# Patient Record
Sex: MALE | Race: WHITE | NOT HISPANIC OR LATINO | Employment: OTHER | URBAN - METROPOLITAN AREA
[De-identification: names, ages, dates, MRNs, and addresses within clinical notes are randomized per-mention and may not be internally consistent; named-entity substitution may affect disease eponyms.]

---

## 2019-12-03 ENCOUNTER — OFFICE VISIT (OUTPATIENT)
Dept: CARDIOLOGY | Facility: MEDICAL CENTER | Age: 79
End: 2019-12-03
Payer: MEDICARE

## 2019-12-03 ENCOUNTER — HOSPITAL ENCOUNTER (OUTPATIENT)
Dept: RADIOLOGY | Facility: MEDICAL CENTER | Age: 79
End: 2019-12-03
Attending: INTERNAL MEDICINE
Payer: MEDICARE

## 2019-12-03 ENCOUNTER — HOSPITAL ENCOUNTER (OUTPATIENT)
Facility: MEDICAL CENTER | Age: 79
End: 2019-12-04
Attending: EMERGENCY MEDICINE | Admitting: HOSPITALIST
Payer: MEDICARE

## 2019-12-03 ENCOUNTER — APPOINTMENT (OUTPATIENT)
Dept: RADIOLOGY | Facility: MEDICAL CENTER | Age: 79
End: 2019-12-03
Payer: MEDICARE

## 2019-12-03 VITALS
BODY MASS INDEX: 27.94 KG/M2 | HEART RATE: 80 BPM | HEIGHT: 67 IN | OXYGEN SATURATION: 95 % | WEIGHT: 178 LBS | DIASTOLIC BLOOD PRESSURE: 70 MMHG | SYSTOLIC BLOOD PRESSURE: 126 MMHG

## 2019-12-03 DIAGNOSIS — I48.0 PAF (PAROXYSMAL ATRIAL FIBRILLATION) (HCC): ICD-10-CM

## 2019-12-03 DIAGNOSIS — R06.02 SHORTNESS OF BREATH: ICD-10-CM

## 2019-12-03 DIAGNOSIS — R05.9 COUGH: ICD-10-CM

## 2019-12-03 DIAGNOSIS — E78.5 DYSLIPIDEMIA: ICD-10-CM

## 2019-12-03 DIAGNOSIS — J69.0 ASPIRATION PNEUMONIA OF RIGHT LOWER LOBE, UNSPECIFIED ASPIRATION PNEUMONIA TYPE (HCC): ICD-10-CM

## 2019-12-03 DIAGNOSIS — I10 ESSENTIAL HYPERTENSION, BENIGN: ICD-10-CM

## 2019-12-03 DIAGNOSIS — Z86.73 HISTORY OF CVA (CEREBROVASCULAR ACCIDENT): ICD-10-CM

## 2019-12-03 DIAGNOSIS — Z87.891 HISTORY OF TOBACCO USE: ICD-10-CM

## 2019-12-03 DIAGNOSIS — Z95.818 STATUS POST PLACEMENT OF IMPLANTABLE LOOP RECORDER: ICD-10-CM

## 2019-12-03 DIAGNOSIS — R47.01 EXPRESSIVE APHASIA: ICD-10-CM

## 2019-12-03 DIAGNOSIS — Z86.39 HISTORY OF HYPOTHYROIDISM: ICD-10-CM

## 2019-12-03 LAB
ALBUMIN SERPL BCP-MCNC: 4.2 G/DL (ref 3.2–4.9)
ALBUMIN/GLOB SERPL: 1.5 G/DL
ALP SERPL-CCNC: 96 U/L (ref 30–99)
ALT SERPL-CCNC: 33 U/L (ref 2–50)
ANION GAP SERPL CALC-SCNC: 9 MMOL/L (ref 0–11.9)
AST SERPL-CCNC: 22 U/L (ref 12–45)
BASOPHILS # BLD AUTO: 0.5 % (ref 0–1.8)
BASOPHILS # BLD: 0.04 K/UL (ref 0–0.12)
BILIRUB SERPL-MCNC: 0.4 MG/DL (ref 0.1–1.5)
BLOOD CULTURE HOLD CXBCH: NORMAL
BUN SERPL-MCNC: 17 MG/DL (ref 8–22)
CALCIUM SERPL-MCNC: 9.4 MG/DL (ref 8.5–10.5)
CHLORIDE SERPL-SCNC: 107 MMOL/L (ref 96–112)
CO2 SERPL-SCNC: 22 MMOL/L (ref 20–33)
CREAT SERPL-MCNC: 0.89 MG/DL (ref 0.5–1.4)
EKG IMPRESSION: NORMAL
EOSINOPHIL # BLD AUTO: 0.35 K/UL (ref 0–0.51)
EOSINOPHIL NFR BLD: 4.4 % (ref 0–6.9)
ERYTHROCYTE [DISTWIDTH] IN BLOOD BY AUTOMATED COUNT: 42.3 FL (ref 35.9–50)
GLOBULIN SER CALC-MCNC: 2.8 G/DL (ref 1.9–3.5)
GLUCOSE SERPL-MCNC: 87 MG/DL (ref 65–99)
HCT VFR BLD AUTO: 53.7 % (ref 42–52)
HGB BLD-MCNC: 18 G/DL (ref 14–18)
IMM GRANULOCYTES # BLD AUTO: 0.02 K/UL (ref 0–0.11)
IMM GRANULOCYTES NFR BLD AUTO: 0.3 % (ref 0–0.9)
LYMPHOCYTES # BLD AUTO: 2.12 K/UL (ref 1–4.8)
LYMPHOCYTES NFR BLD: 26.5 % (ref 22–41)
MCH RBC QN AUTO: 30.1 PG (ref 27–33)
MCHC RBC AUTO-ENTMCNC: 33.5 G/DL (ref 33.7–35.3)
MCV RBC AUTO: 89.8 FL (ref 81.4–97.8)
MONOCYTES # BLD AUTO: 0.54 K/UL (ref 0–0.85)
MONOCYTES NFR BLD AUTO: 6.8 % (ref 0–13.4)
NEUTROPHILS # BLD AUTO: 4.93 K/UL (ref 1.82–7.42)
NEUTROPHILS NFR BLD: 61.5 % (ref 44–72)
NRBC # BLD AUTO: 0 K/UL
NRBC BLD-RTO: 0 /100 WBC
PLATELET # BLD AUTO: 286 K/UL (ref 164–446)
PMV BLD AUTO: 8.6 FL (ref 9–12.9)
POTASSIUM SERPL-SCNC: 4.3 MMOL/L (ref 3.6–5.5)
PROCALCITONIN SERPL-MCNC: <0.05 NG/ML
PROT SERPL-MCNC: 7 G/DL (ref 6–8.2)
RBC # BLD AUTO: 5.98 M/UL (ref 4.7–6.1)
SODIUM SERPL-SCNC: 138 MMOL/L (ref 135–145)
WBC # BLD AUTO: 8 K/UL (ref 4.8–10.8)

## 2019-12-03 PROCEDURE — 71046 X-RAY EXAM CHEST 2 VIEWS: CPT

## 2019-12-03 PROCEDURE — 99220 PR INITIAL OBSERVATION CARE,LEVL III: CPT | Performed by: HOSPITALIST

## 2019-12-03 PROCEDURE — 99285 EMERGENCY DEPT VISIT HI MDM: CPT

## 2019-12-03 PROCEDURE — 85025 COMPLETE CBC W/AUTO DIFF WBC: CPT

## 2019-12-03 PROCEDURE — 700111 HCHG RX REV CODE 636 W/ 250 OVERRIDE (IP): Performed by: EMERGENCY MEDICINE

## 2019-12-03 PROCEDURE — 93005 ELECTROCARDIOGRAM TRACING: CPT

## 2019-12-03 PROCEDURE — 94640 AIRWAY INHALATION TREATMENT: CPT

## 2019-12-03 PROCEDURE — 36415 COLL VENOUS BLD VENIPUNCTURE: CPT

## 2019-12-03 PROCEDURE — 700105 HCHG RX REV CODE 258: Performed by: EMERGENCY MEDICINE

## 2019-12-03 PROCEDURE — 93005 ELECTROCARDIOGRAM TRACING: CPT | Performed by: EMERGENCY MEDICINE

## 2019-12-03 PROCEDURE — G0378 HOSPITAL OBSERVATION PER HR: HCPCS

## 2019-12-03 PROCEDURE — 99205 OFFICE O/P NEW HI 60 MIN: CPT | Performed by: INTERNAL MEDICINE

## 2019-12-03 PROCEDURE — 80053 COMPREHEN METABOLIC PANEL: CPT

## 2019-12-03 PROCEDURE — 93000 ELECTROCARDIOGRAM COMPLETE: CPT | Performed by: INTERNAL MEDICINE

## 2019-12-03 PROCEDURE — 87040 BLOOD CULTURE FOR BACTERIA: CPT | Mod: 91

## 2019-12-03 PROCEDURE — 700101 HCHG RX REV CODE 250: Performed by: EMERGENCY MEDICINE

## 2019-12-03 PROCEDURE — 84145 PROCALCITONIN (PCT): CPT

## 2019-12-03 PROCEDURE — 96365 THER/PROPH/DIAG IV INF INIT: CPT

## 2019-12-03 RX ORDER — FLUTICASONE PROPIONATE 110 UG/1
2 AEROSOL, METERED RESPIRATORY (INHALATION) 2 TIMES DAILY
COMMUNITY

## 2019-12-03 RX ORDER — LEVOTHYROXINE SODIUM 175 UG/1
175 TABLET ORAL
Status: DISCONTINUED | OUTPATIENT
Start: 2019-12-04 | End: 2019-12-04 | Stop reason: HOSPADM

## 2019-12-03 RX ORDER — ONDANSETRON 4 MG/1
4 TABLET, ORALLY DISINTEGRATING ORAL EVERY 4 HOURS PRN
Status: DISCONTINUED | OUTPATIENT
Start: 2019-12-03 | End: 2019-12-04 | Stop reason: HOSPADM

## 2019-12-03 RX ORDER — LOSARTAN POTASSIUM 25 MG/1
25 TABLET ORAL DAILY
Qty: 90 TAB | Refills: 3 | Status: SHIPPED | OUTPATIENT
Start: 2019-12-03 | End: 2019-12-03

## 2019-12-03 RX ORDER — BISACODYL 10 MG
10 SUPPOSITORY, RECTAL RECTAL
Status: DISCONTINUED | OUTPATIENT
Start: 2019-12-03 | End: 2019-12-04 | Stop reason: HOSPADM

## 2019-12-03 RX ORDER — LISINOPRIL 5 MG/1
5 TABLET ORAL DAILY
Status: ON HOLD | COMMUNITY
End: 2019-12-04

## 2019-12-03 RX ORDER — LOSARTAN POTASSIUM 25 MG/1
25 TABLET ORAL DAILY
Status: DISCONTINUED | OUTPATIENT
Start: 2019-12-04 | End: 2019-12-03

## 2019-12-03 RX ORDER — ALBUTEROL SULFATE 90 UG/1
2 AEROSOL, METERED RESPIRATORY (INHALATION) EVERY 6 HOURS PRN
COMMUNITY

## 2019-12-03 RX ORDER — ATORVASTATIN CALCIUM 80 MG/1
80 TABLET, FILM COATED ORAL NIGHTLY
COMMUNITY

## 2019-12-03 RX ORDER — IPRATROPIUM BROMIDE AND ALBUTEROL SULFATE 2.5; .5 MG/3ML; MG/3ML
3 SOLUTION RESPIRATORY (INHALATION) ONCE
Status: COMPLETED | OUTPATIENT
Start: 2019-12-03 | End: 2019-12-03

## 2019-12-03 RX ORDER — AMOXICILLIN 250 MG
2 CAPSULE ORAL 2 TIMES DAILY
Status: DISCONTINUED | OUTPATIENT
Start: 2019-12-04 | End: 2019-12-04 | Stop reason: HOSPADM

## 2019-12-03 RX ORDER — CLOPIDOGREL BISULFATE 75 MG/1
75 TABLET ORAL DAILY
COMMUNITY

## 2019-12-03 RX ORDER — LOSARTAN POTASSIUM 50 MG/1
25 TABLET ORAL DAILY
Status: DISCONTINUED | OUTPATIENT
Start: 2019-12-04 | End: 2019-12-04 | Stop reason: HOSPADM

## 2019-12-03 RX ORDER — ATORVASTATIN CALCIUM 80 MG/1
80 TABLET, FILM COATED ORAL NIGHTLY
Status: DISCONTINUED | OUTPATIENT
Start: 2019-12-03 | End: 2019-12-04 | Stop reason: HOSPADM

## 2019-12-03 RX ORDER — ONDANSETRON 2 MG/ML
4 INJECTION INTRAMUSCULAR; INTRAVENOUS EVERY 4 HOURS PRN
Status: DISCONTINUED | OUTPATIENT
Start: 2019-12-03 | End: 2019-12-04 | Stop reason: HOSPADM

## 2019-12-03 RX ORDER — NICOTINE POLACRILEX 4 MG/1
20 GUM, CHEWING ORAL DAILY
COMMUNITY

## 2019-12-03 RX ORDER — CLOPIDOGREL BISULFATE 75 MG/1
75 TABLET ORAL DAILY
Status: DISCONTINUED | OUTPATIENT
Start: 2019-12-04 | End: 2019-12-04 | Stop reason: HOSPADM

## 2019-12-03 RX ORDER — AZITHROMYCIN 500 MG/1
500 INJECTION, POWDER, LYOPHILIZED, FOR SOLUTION INTRAVENOUS ONCE
Status: COMPLETED | OUTPATIENT
Start: 2019-12-03 | End: 2019-12-04

## 2019-12-03 RX ORDER — LEVOTHYROXINE SODIUM 175 UG/1
175 TABLET ORAL
COMMUNITY

## 2019-12-03 RX ORDER — POLYETHYLENE GLYCOL 3350 17 G/17G
1 POWDER, FOR SOLUTION ORAL
Status: DISCONTINUED | OUTPATIENT
Start: 2019-12-03 | End: 2019-12-04 | Stop reason: HOSPADM

## 2019-12-03 RX ORDER — LISINOPRIL 5 MG/1
5 TABLET ORAL DAILY
COMMUNITY
End: 2019-12-03

## 2019-12-03 RX ORDER — OMEPRAZOLE 20 MG/1
20 CAPSULE, DELAYED RELEASE ORAL DAILY
Status: DISCONTINUED | OUTPATIENT
Start: 2019-12-04 | End: 2019-12-04 | Stop reason: HOSPADM

## 2019-12-03 RX ADMIN — IPRATROPIUM BROMIDE AND ALBUTEROL SULFATE 3 ML: .5; 3 SOLUTION RESPIRATORY (INHALATION) at 21:21

## 2019-12-03 RX ADMIN — AMPICILLIN SODIUM AND SULBACTAM SODIUM 3 G: 2; 1 INJECTION, POWDER, FOR SOLUTION INTRAMUSCULAR; INTRAVENOUS at 22:47

## 2019-12-03 SDOH — HEALTH STABILITY: MENTAL HEALTH: HOW OFTEN DO YOU HAVE A DRINK CONTAINING ALCOHOL?: NOT ASKED

## 2019-12-03 ASSESSMENT — ENCOUNTER SYMPTOMS
SHORTNESS OF BREATH: 1
PALPITATIONS: 0
FOCAL WEAKNESS: 1
ABDOMINAL PAIN: 0
PND: 0
BLURRED VISION: 0
HEARTBURN: 1
MEMORY LOSS: 1
DIZZINESS: 0
SPEECH CHANGE: 1
INSOMNIA: 0
CONSTIPATION: 1
WHEEZING: 1
DIARRHEA: 1
CHILLS: 0
LOSS OF CONSCIOUSNESS: 0
FEVER: 0
MYALGIAS: 0
WEAKNESS: 1
ORTHOPNEA: 0
FALLS: 1
COUGH: 1

## 2019-12-03 ASSESSMENT — CHA2DS2 SCORE
AGE 65 TO 74: NO
CHF OR LEFT VENTRICULAR DYSFUNCTION: NO
DIABETES: NO
HYPERTENSION: YES
VASCULAR DISEASE: NO
AGE 75 OR GREATER: YES
CHA2DS2 VASC SCORE: 5
PRIOR STROKE OR TIA OR THROMBOEMBOLISM: YES
SEX: MALE

## 2019-12-03 NOTE — PROGRESS NOTES
Chief Complaint   Patient presents with   • New Patient       Subjective:   Segundo Falk is a 79 y.o. male who presents today referred by Dell Moore nurse practitioner with [facility] for cardiology consultation.    The patient is accompanied by his wife and a local family friend.  There is limited information regarding his medical and cardiac condition.    According to the patient's available medical records he has a history cerebrovascular accident 2015, hydrocephalus, Parkinson's-like syndrome, hypertension, dyslipidemia, chronic pipe smoker abstinent x10 years, asthma and hypothyroidism.    The patient and his wife have moved to [place] 3 months ago from [place], [place].  They currently reside at [facility].    The patient had an implantable loop recorder placed, according to his wife possibly a year ago.  To their knowledge they have never been told of having atrial fibrillation or being on a blood thinner.  He currently takes Plavix.    The patient denies chest pain or palpitations.    The patient has an incessant cough, has difficulty swallowing and easily chokes while eating or drinking water.    Past Medical History:   Diagnosis Date   • Cough 12/3/2019   • Dyslipidemia 12/3/2019   • Essential hypertension, benign 12/3/2019   • History of CVA (cerebrovascular accident) 12/3/2019   • History of hypothyroidism 12/3/2019   • History of tobacco use 12/3/2019   • PAF (paroxysmal atrial fibrillation) ([facility]) 12/3/2019   • Status post placement of implantable loop recorder 12/3/2019     History reviewed. No pertinent surgical history.  Family History   Problem Relation Age of Onset   • Heart Disease Neg Hx      Social History     Socioeconomic History   • Marital status:      Spouse name: Not on file   • Number of children: Not on file   • Years of education: Not on file   • Highest education level: Not on file   Occupational History   • Not on file   Social Needs   • Financial  resource strain: Not on file   • Food insecurity:     Worry: Not on file     Inability: Not on file   • Transportation needs:     Medical: Not on file     Non-medical: Not on file   Tobacco Use   • Smoking status: Former Smoker     Packs/day: 0.00     Years: 30.00     Pack years: 0.00     Types: Pipe     Last attempt to quit: 12/3/2009     Years since quitting: 10.0   • Smokeless tobacco: Never Used   Substance and Sexual Activity   • Alcohol use: Yes   • Drug use: Never   • Sexual activity: Not on file   Lifestyle   • Physical activity:     Days per week: Not on file     Minutes per session: Not on file   • Stress: Not on file   Relationships   • Social connections:     Talks on phone: Not on file     Gets together: Not on file     Attends Mandaeism service: Not on file     Active member of club or organization: Not on file     Attends meetings of clubs or organizations: Not on file     Relationship status: Not on file   • Intimate partner violence:     Fear of current or ex partner: Not on file     Emotionally abused: Not on file     Physically abused: Not on file     Forced sexual activity: Not on file   Other Topics Concern   • Not on file   Social History Narrative   • Not on file     No Known Allergies  Outpatient Encounter Medications as of 12/3/2019   Medication Sig Dispense Refill   • atorvastatin (LIPITOR) 80 MG tablet Take 80 mg by mouth every evening.     • omeprazole (PRILOSEC) 20 MG Tablet Delayed Response delayed-release tablet Take  by mouth.     • clopidogrel (PLAVIX) 75 MG Tab Take 75 mg by mouth every day.     • levothyroxine (SYNTHROID) 175 MCG Tab Take 175 mcg by mouth Every morning on an empty stomach.     • albuterol 108 (90 Base) MCG/ACT Aero Soln inhalation aerosol Inhale 2 Puffs by mouth every 6 hours as needed for Shortness of Breath.     • fluticasone (FLOVENT HFA) 110 MCG/ACT Aerosol Inhale 2 Puffs by mouth 2 times a day.     • losartan (COZAAR) 25 MG Tab Take 1 Tab by mouth every day.  "90 Tab 3   • [DISCONTINUED] lisinopril (PRINIVIL) 5 MG Tab Take 5 mg by mouth every day.       No facility-administered encounter medications on file as of 12/3/2019.      Review of Systems   Constitutional: Positive for malaise/fatigue. Negative for chills and fever.   HENT: Positive for congestion, hearing loss and nosebleeds.    Eyes: Negative for blurred vision.   Respiratory: Positive for cough, shortness of breath and wheezing.    Cardiovascular: Negative for chest pain, palpitations, orthopnea, leg swelling and PND.   Gastrointestinal: Positive for constipation, diarrhea and heartburn. Negative for abdominal pain.   Genitourinary: Positive for frequency. Negative for dysuria.   Musculoskeletal: Positive for falls. Negative for joint pain and myalgias.   Skin: Negative for rash.   Neurological: Positive for speech change, focal weakness and weakness. Negative for dizziness and loss of consciousness.   Endo/Heme/Allergies: Positive for environmental allergies.   Psychiatric/Behavioral: Positive for memory loss. The patient does not have insomnia.         Objective:   /70 (BP Location: Left arm, Patient Position: Sitting, BP Cuff Size: Adult)   Pulse 80   Ht 1.702 m (5' 7\")   Wt 80.7 kg (178 lb)   SpO2 95%   BMI 27.88 kg/m²     Physical Exam   Constitutional: He appears well-nourished. No distress.   Chronically ill-appearing.  Incessant cough with choking.  Elderly.  In a wheelchair.   Eyes: Pupils are equal, round, and reactive to light. Conjunctivae and EOM are normal.   Neck: Normal range of motion. Neck supple. No JVD present.   Cardiovascular: Normal rate, regular rhythm, normal heart sounds and intact distal pulses. Exam reveals no gallop and no friction rub.   No murmur heard.  Pulses:       Carotid pulses are 2+ on the right side and 2+ on the left side.  Pulmonary/Chest: Effort normal and breath sounds normal. No accessory muscle usage. No respiratory distress. He has no wheezes. He has no " rales.   Abdominal: Soft. He exhibits no distension and no mass. There is no hepatosplenomegaly. There is no tenderness.   Musculoskeletal:         General: No edema.   Neurological: He is alert.   Speech difficulty.  Generalized weakness.   Skin: Skin is warm, dry and intact. No rash noted. No cyanosis. Nails show no clubbing.   Psychiatric: He has a normal mood and affect. His behavior is normal.   Vitals reviewed.    EKG 12/03/2019 normal sinus rhythm, rate 72.  First-degree AV block.  Reviewed by myself.    Laboratory 11/18/2019 from [facility] reviewed  .  HDL 46.  .  Triglycerides 172.  Normal renal function and electrolytes.    Assessment:     1. PAF (paroxysmal atrial fibrillation) (HCC)     2. Cough  DX-CHEST-2 VIEWS   3. Essential hypertension, benign  EKG   4. Dyslipidemia     5. History of hypothyroidism     6. Status post placement of implantable loop recorder     7. History of CVA (cerebrovascular accident)     8. History of tobacco use         Medical Decision Making:  Today's Assessment / Status / Plan:   Assessment  1.  Paroxysmal atrial fibrillation documented on downloaded loop recorder tracing 6/7/2019.  2.  Cerebrovascular accident.  2015 with functional deficits.  3.  Hypertension.  4.  Dyslipidemia.  5.  History of hydrocephalus with apparent associated Parkinson's disorder.  6.  Cough, concern for aspiration, possible contribution from ACE inhibitor.    Recommendation Discussion  1.  Eliquis 5 mg twice daily.  2.  CXR.  3.  Refer to PCP, provided list of physicians.  4.  Needs swallowing evaluation.  5.  Discontinue lisinopril  6.  Start losartan 25 mg daily.  7.  Obtain medical records from Dr. Allan Bernard, cardiologist [place], [place].  8.  Follow-up 1 month with DARCIN and subsequent MD follow-up 3 months.

## 2019-12-04 ENCOUNTER — APPOINTMENT (OUTPATIENT)
Dept: RADIOLOGY | Facility: MEDICAL CENTER | Age: 79
End: 2019-12-04
Attending: HOSPITALIST
Payer: MEDICARE

## 2019-12-04 ENCOUNTER — PATIENT OUTREACH (OUTPATIENT)
Dept: HEALTH INFORMATION MANAGEMENT | Facility: OTHER | Age: 79
End: 2019-12-04

## 2019-12-04 VITALS
HEIGHT: 67 IN | WEIGHT: 186.95 LBS | DIASTOLIC BLOOD PRESSURE: 71 MMHG | RESPIRATION RATE: 18 BRPM | OXYGEN SATURATION: 92 % | BODY MASS INDEX: 29.34 KG/M2 | HEART RATE: 74 BPM | SYSTOLIC BLOOD PRESSURE: 127 MMHG | TEMPERATURE: 97.6 F

## 2019-12-04 DIAGNOSIS — Z86.73 HISTORY OF CVA (CEREBROVASCULAR ACCIDENT): ICD-10-CM

## 2019-12-04 DIAGNOSIS — I48.0 PAF (PAROXYSMAL ATRIAL FIBRILLATION) (HCC): ICD-10-CM

## 2019-12-04 LAB
ALBUMIN SERPL BCP-MCNC: 3.7 G/DL (ref 3.2–4.9)
ALBUMIN/GLOB SERPL: 1.8 G/DL
ALP SERPL-CCNC: 73 U/L (ref 30–99)
ALT SERPL-CCNC: 30 U/L (ref 2–50)
ANION GAP SERPL CALC-SCNC: 7 MMOL/L (ref 0–11.9)
AST SERPL-CCNC: 22 U/L (ref 12–45)
BASOPHILS # BLD AUTO: 0.5 % (ref 0–1.8)
BASOPHILS # BLD: 0.05 K/UL (ref 0–0.12)
BILIRUB SERPL-MCNC: 0.5 MG/DL (ref 0.1–1.5)
BUN SERPL-MCNC: 15 MG/DL (ref 8–22)
CALCIUM SERPL-MCNC: 8.4 MG/DL (ref 8.5–10.5)
CHLORIDE SERPL-SCNC: 110 MMOL/L (ref 96–112)
CO2 SERPL-SCNC: 22 MMOL/L (ref 20–33)
CREAT SERPL-MCNC: 0.77 MG/DL (ref 0.5–1.4)
EKG IMPRESSION: NORMAL
EOSINOPHIL # BLD AUTO: 0.41 K/UL (ref 0–0.51)
EOSINOPHIL NFR BLD: 4.3 % (ref 0–6.9)
ERYTHROCYTE [DISTWIDTH] IN BLOOD BY AUTOMATED COUNT: 41.6 FL (ref 35.9–50)
GLOBULIN SER CALC-MCNC: 2.1 G/DL (ref 1.9–3.5)
GLUCOSE SERPL-MCNC: 87 MG/DL (ref 65–99)
HCT VFR BLD AUTO: 48.9 % (ref 42–52)
HGB BLD-MCNC: 16 G/DL (ref 14–18)
IMM GRANULOCYTES # BLD AUTO: 0.03 K/UL (ref 0–0.11)
IMM GRANULOCYTES NFR BLD AUTO: 0.3 % (ref 0–0.9)
LYMPHOCYTES # BLD AUTO: 2.08 K/UL (ref 1–4.8)
LYMPHOCYTES NFR BLD: 22 % (ref 22–41)
MCH RBC QN AUTO: 29.4 PG (ref 27–33)
MCHC RBC AUTO-ENTMCNC: 32.7 G/DL (ref 33.7–35.3)
MCV RBC AUTO: 89.7 FL (ref 81.4–97.8)
MONOCYTES # BLD AUTO: 0.6 K/UL (ref 0–0.85)
MONOCYTES NFR BLD AUTO: 6.3 % (ref 0–13.4)
NEUTROPHILS # BLD AUTO: 6.29 K/UL (ref 1.82–7.42)
NEUTROPHILS NFR BLD: 66.6 % (ref 44–72)
NRBC # BLD AUTO: 0 K/UL
NRBC BLD-RTO: 0 /100 WBC
PLATELET # BLD AUTO: 252 K/UL (ref 164–446)
PMV BLD AUTO: 8.9 FL (ref 9–12.9)
POTASSIUM SERPL-SCNC: 4.1 MMOL/L (ref 3.6–5.5)
PROT SERPL-MCNC: 5.8 G/DL (ref 6–8.2)
RBC # BLD AUTO: 5.45 M/UL (ref 4.7–6.1)
SODIUM SERPL-SCNC: 139 MMOL/L (ref 135–145)
WBC # BLD AUTO: 9.5 K/UL (ref 4.8–10.8)

## 2019-12-04 PROCEDURE — 92610 EVALUATE SWALLOWING FUNCTION: CPT

## 2019-12-04 PROCEDURE — 96366 THER/PROPH/DIAG IV INF ADDON: CPT

## 2019-12-04 PROCEDURE — 80053 COMPREHEN METABOLIC PANEL: CPT

## 2019-12-04 PROCEDURE — 71045 X-RAY EXAM CHEST 1 VIEW: CPT

## 2019-12-04 PROCEDURE — 700111 HCHG RX REV CODE 636 W/ 250 OVERRIDE (IP): Performed by: HOSPITALIST

## 2019-12-04 PROCEDURE — 700105 HCHG RX REV CODE 258: Performed by: HOSPITALIST

## 2019-12-04 PROCEDURE — 96367 TX/PROPH/DG ADDL SEQ IV INF: CPT

## 2019-12-04 PROCEDURE — 700111 HCHG RX REV CODE 636 W/ 250 OVERRIDE (IP): Performed by: EMERGENCY MEDICINE

## 2019-12-04 PROCEDURE — G0378 HOSPITAL OBSERVATION PER HR: HCPCS

## 2019-12-04 PROCEDURE — 85025 COMPLETE CBC W/AUTO DIFF WBC: CPT

## 2019-12-04 PROCEDURE — 99217 PR OBSERVATION CARE DISCHARGE: CPT | Performed by: INTERNAL MEDICINE

## 2019-12-04 PROCEDURE — 96375 TX/PRO/DX INJ NEW DRUG ADDON: CPT

## 2019-12-04 RX ORDER — FAMOTIDINE 20 MG/1
20 TABLET, FILM COATED ORAL 2 TIMES DAILY
Qty: 30 TAB | Refills: 0 | Status: SHIPPED | OUTPATIENT
Start: 2019-12-04 | End: 2019-12-04

## 2019-12-04 RX ORDER — DIPHENHYDRAMINE HYDROCHLORIDE 50 MG/ML
25 INJECTION INTRAMUSCULAR; INTRAVENOUS ONCE
Status: COMPLETED | OUTPATIENT
Start: 2019-12-04 | End: 2019-12-04

## 2019-12-04 RX ORDER — LOSARTAN POTASSIUM 25 MG/1
25 TABLET ORAL DAILY
Qty: 90 TAB | Refills: 3 | Status: SHIPPED | OUTPATIENT
Start: 2019-12-04

## 2019-12-04 RX ADMIN — DIPHENHYDRAMINE HYDROCHLORIDE 25 MG: 50 INJECTION INTRAMUSCULAR; INTRAVENOUS at 03:25

## 2019-12-04 RX ADMIN — AZITHROMYCIN MONOHYDRATE 500 MG: 500 INJECTION, POWDER, LYOPHILIZED, FOR SOLUTION INTRAVENOUS at 01:28

## 2019-12-04 RX ADMIN — AMPICILLIN SODIUM AND SULBACTAM SODIUM 3 G: 2; 1 INJECTION, POWDER, FOR SOLUTION INTRAMUSCULAR; INTRAVENOUS at 07:40

## 2019-12-04 ASSESSMENT — LIFESTYLE VARIABLES
HOW MANY TIMES IN THE PAST YEAR HAVE YOU HAD 5 OR MORE DRINKS IN A DAY: 0
TOTAL SCORE: 0
HAVE PEOPLE ANNOYED YOU BY CRITICIZING YOUR DRINKING: NO
EVER FELT BAD OR GUILTY ABOUT YOUR DRINKING: NO
TOTAL SCORE: 0
CONSUMPTION TOTAL: INCOMPLETE
EVER HAD A DRINK FIRST THING IN THE MORNING TO STEADY YOUR NERVES TO GET RID OF A HANGOVER: NO
ON A TYPICAL DAY WHEN YOU DRINK ALCOHOL HOW MANY DRINKS DO YOU HAVE: 2
HOW MANY TIMES IN THE PAST YEAR HAVE YOU HAD 5 OR MORE DRINKS IN A DAY: 0
EVER FELT BAD OR GUILTY ABOUT YOUR DRINKING: NO
CONSUMPTION TOTAL: NEGATIVE
DOES PATIENT WANT TO STOP DRINKING: NO
EVER_SMOKED: YES
ALCOHOL_USE: YES
ALCOHOL_USE: YES
HAVE YOU EVER FELT YOU SHOULD CUT DOWN ON YOUR DRINKING: NO
AVERAGE NUMBER OF DAYS PER WEEK YOU HAVE A DRINK CONTAINING ALCOHOL: 7
AVERAGE NUMBER OF DAYS PER WEEK YOU HAVE A DRINK CONTAINING ALCOHOL: 7
EVER HAD A DRINK FIRST THING IN THE MORNING TO STEADY YOUR NERVES TO GET RID OF A HANGOVER: NO
TOTAL SCORE: 0
ON A TYPICAL DAY WHEN YOU DRINK ALCOHOL HOW MANY DRINKS DO YOU HAVE: 2
HAVE YOU EVER FELT YOU SHOULD CUT DOWN ON YOUR DRINKING: NO
DOES PATIENT WANT TO STOP DRINKING: NO

## 2019-12-04 ASSESSMENT — PATIENT HEALTH QUESTIONNAIRE - PHQ9
SUM OF ALL RESPONSES TO PHQ9 QUESTIONS 1 AND 2: 0
1. LITTLE INTEREST OR PLEASURE IN DOING THINGS: NOT AT ALL
2. FEELING DOWN, DEPRESSED, IRRITABLE, OR HOPELESS: NOT AT ALL

## 2019-12-04 NOTE — CARE PLAN
Problem: Communication  Goal: The ability to communicate needs accurately and effectively will improve  Outcome: PROGRESSING AS EXPECTED     Problem: Safety  Goal: Will remain free from injury  Outcome: PROGRESSING AS EXPECTED  Goal: Will remain free from falls  Outcome: PROGRESSING AS EXPECTED     Problem: Infection  Goal: Will remain free from infection  Outcome: PROGRESSING AS EXPECTED     Problem: Discharge Barriers/Planning  Goal: Patient's continuum of care needs will be met  Outcome: PROGRESSING AS EXPECTED     Problem: Oxygenation/Respiratory Function  Goal: Patient will Achieve/Maintain Optimum Respiratory Rate/Effort  Outcome: PROGRESSING AS EXPECTED     Problem: Risk of Aspiration  Goal: Absence of aspiration  Outcome: PROGRESSING AS EXPECTED

## 2019-12-04 NOTE — DISCHARGE SUMMARY
Discharge Summary    CHIEF COMPLAINT ON ADMISSION  Chief Complaint   Patient presents with   • Cough     Pt reports cough for wks/ productive white/clear sputum. mild increased SOB. pt sent by MD for furter eval after chest x ray and interrogation of pacer. per family pt having an increased difficult time managing secreations.    • Shortness of Breath   • Sent by MD       Reason for Admission  Sent by MD; SOB     Admission Date  12/3/2019    CODE STATUS  Full Code    HPI & HOSPITAL COURSE  This is a 79 y.o. male here with cough.  Please see the dictated HPI 12/3/2019 for complete details.  In short, the patient was recently seen by his cardiologist for atrial fibrillation and started on anticoagulation and was noted to have a cough at that time.  Cough duration had been long-standing and thought to be related to his ACE inhibitor lisinopril.  This was discontinued but unfortunately the patient continued to have paroxysms of cough that precipitated his presentation to the emergency room.  Laboratory evaluation was unrevealing.  Procalcitonin was normal.  Blood cultures were drawn and have been no growth to date.  Chest x-ray revealed no acute cardiopulmonary process.  Patient does have history of CVA and concern regarding aspiration pneumonia at presentation led to the administration of antibiotics.  These were later discontinued.    Patient was evaluated by speech-language pathology who was recommended a dysphagia 3 nectar thick liquid diet given his long-standing stroke with sequelae.  Otherwise, he is medically stable and clear for discharge with routine follow-up with the PCP and cardiology. Therefore, he is discharged in good and stable condition to home with close outpatient follow-up.    Discharge Date  12/4/2019    FOLLOW UP ITEMS POST DISCHARGE  PCP follow-up  Cardiology follow-up    DISCHARGE DIAGNOSES  Principal Problem:    Cough POA: Yes  Active Problems:    Dyslipidemia POA: Yes    History of  hypothyroidism POA: Yes    Essential hypertension, benign POA: Yes    History of CVA (cerebrovascular accident) POA: Yes    PAF (paroxysmal atrial fibrillation) (HCC) POA: Yes  Resolved Problems:    * No resolved hospital problems. *      FOLLOW UP  Future Appointments   Date Time Provider Department Center   3/5/2020  2:20 PM Mikel Lira M.D. [facility] None     Dell Moore [address]  [address] [facility] [address]  [phone]            MEDICATIONS ON DISCHARGE     Medication List      START taking these medications      Instructions   apixaban 5mg Tabs  Commonly known as:  ELIQUIS   Take 1 Tab by mouth 2 Times a Day.  Dose:  5 mg        CHANGE how you take these medications      Instructions   losartan 25 MG Tabs  What changed:  additional instructions  Commonly known as:  COZAAR   Take 1 Tab by mouth every day. STOP IF COUGH DEVELOPS  Dose:  25 mg        CONTINUE taking these medications      Instructions   albuterol 108 (90 Base) MCG/ACT Aers inhalation aerosol   Inhale 2 Puffs by mouth every 6 hours as needed for Shortness of Breath.  Dose:  2 Puff     atorvastatin 80 MG tablet  Commonly known as:  LIPITOR   Take 80 mg by mouth every evening.  Dose:  80 mg     clopidogrel 75 MG Tabs  Commonly known as:  PLAVIX   Take 75 mg by mouth every day.  Dose:  75 mg     fluticasone 110 MCG/ACT Aero  Commonly known as:  FLOVENT HFA   Inhale 2 Puffs by mouth 2 times a day.  Dose:  2 Puff     levothyroxine 175 MCG Tabs  Commonly known as:  SYNTHROID   Take 175 mcg by mouth Every morning on an empty stomach.  Dose:  175 mcg     omeprazole 20 MG Tbec delayed-release tablet  Commonly known as:  PRILOSEC   Take 20 mg by mouth every day.  Dose:  20 mg        STOP taking these medications    lisinopril 5 MG Tabs  Commonly known as:  PRINIVIL            Allergies  No Known Allergies    DIET  Orders Placed This Encounter   Procedures   • Diet Order Regular     Standing Status:   Standing     Number of Occurrences:   1      Order Specific Question:   Diet:     Answer:   Regular [1]     Order Specific Question:   Texture/Fiber modifications:     Answer:   Dysphagia 3(Mechanical Soft)specify fluid consistency(question 6) [3]     Order Specific Question:   Consistency/Fluid modifications:     Answer:   Nectar Thick [2]       ACTIVITY  As tolerated.  Weight bearing as tolerated    LABORATORY  Lab Results   Component Value Date    SODIUM 139 12/04/2019    POTASSIUM 4.1 12/04/2019    CHLORIDE 110 12/04/2019    CO2 22 12/04/2019    GLUCOSE 87 12/04/2019    BUN 15 12/04/2019    CREATININE 0.77 12/04/2019        Lab Results   Component Value Date    WBC 9.5 12/04/2019    HEMOGLOBIN 16.0 12/04/2019    HEMATOCRIT 48.9 12/04/2019    PLATELETCT 252 12/04/2019        Total time of the discharge process exceeds 40 minutes.

## 2019-12-04 NOTE — PROGRESS NOTES
Pt into unit from ED via gurney, Pt is fully awake, alert and oriented to self and place only, responding verbally, slow to respond, with mild to mod aphasia at baseline (post CVA 4 yrs ago per wife), speaking clear words and short sentences, follows to commands, able to make needs known. Intermittent coughing and frequent clearing of throat noted, able to manage secretions, no signs of respiratory distress, O2 sats WNL on room air. Pt ambulatory w/ unsteady gait, use of four wheel walker, one person assist.  Admit profile completed through help of wife Bhavani and friend Darlene over the phone. Initial assessment done. Safety and comfort measures provided. Strict NPO and aspiration precautions observed. Dysphagia screening not attempted d/t patient's difficulty swallowing liquids per pt's wife. Fall precautions in place, bed alarms in place. Encouraged to verbalize feelings and needs.  Will continue to assess and monitor.

## 2019-12-04 NOTE — DISCHARGE INSTRUCTIONS
Discharge Instructions per LUCIA Honeycutt.    1.  Review your discharge Medication Reconciliation form as you may be provided with new prescriptions or changes to previously prescribed medications.  Be sure to take all of your prescribed medications exactly as directed.  Further questions can be directed to your local pharmacist or primary care provider (PCP).    2. Follow-up with your PCP.  An appointment may have already been scheduled for you.  Please review your discharge paperwork and locate this information.  Please be sure to call your PCP to cancel if you are unable to make this appointment or require schedule changes.  It is critical to to follow-up with your PCP to review hospital records and ensure any further diagnostic work-up, prescription refills, or referrals.     3. ACTIVITIES: After discharge from the hospital, you may resume routine activities including walking and going u/down stairs. However, no strenuous activity. For further clarification, call your PCP     4. DRIVING: You may drive whenever you are off pain medications and are able to perform the activities needed to drive, i.e. turning, bending, twisting, etc.     5. BOWEL FUNCTION: A few patients, after hospitalizations, will develop bowel irregularity. You could also experience constipation due to pain medication and decreased activity level. If you feel this is occurring, please take an over-the-counter laxative (Milk of Magnesia, Ex-Lax, Senokot, etc.) until the problem has resolved.     Return to ER if you develop recurrent chest pain, shortness of breath, bloody sputum, fever of 101.5 or greater, intractable nausea or vomiting, blood in the vomit or urine, intractable pain, new onset inability to ambulate, focal motor weakness, acute vision disturbance, acute speech disturbance, intractable abdominal pain, or any other worrisome symptoms.

## 2019-12-04 NOTE — ED NOTES
"Dysphagia screening not complete d/t wife and family member reporting patient having difficulty swallowing small amount of water and cannot tolerate larger drinks at all \"he spits it out.\" Katy BRYANT aware.   "

## 2019-12-04 NOTE — H&P
Hospital Medicine History & Physical Note    Date of Service  12/3/2019    Primary Care Physician  Dell Moore N.P.    Consultants  none    Code Status  full    Chief Complaint  Cough     History of Presenting Illness  79 y.o. male who presented 12/3/2019 with past medical history of hypertension, previous CA PEA resulted in a aphasia, paroxysmal atrial fibrillation, hypothyroidism who presents with cough.  Patient had a cough for about 3 months.  He does have a aphasia at baseline.  He has had difficulty with swallowing recently.  He coughs multiple times until he turns red and reaches a near syncopal state.  He moved to [place] 3 months ago and that when his symptoms started.  He is currently on lisinopril.  Otherwise he has no known alleviating or exacerbating factors to his symptoms.  He denies any fevers.  No congestion.  No alleviating or exacerbating factors otherwise to his symptoms.    Review of Systems  Review of Systems   Unable to perform ROS: Patient nonverbal       Past Medical History   has a past medical history of Cough (12/3/2019), Dyslipidemia (12/3/2019), Essential hypertension, benign (12/3/2019), History of CVA (cerebrovascular accident) (12/3/2019), History of hypothyroidism (12/3/2019), History of tobacco use (12/3/2019), PAF (paroxysmal atrial fibrillation) ([facility]) (12/3/2019), and Status post placement of implantable loop recorder (12/3/2019).    Surgical History  Reviewed and not pertinent     Family History  Reviewed and not peritnent     Social History   reports that he quit smoking about 10 years ago. His smoking use included pipe. He smoked 0.00 packs per day for 30.00 years. He has never used smokeless tobacco. He reports current alcohol use. He reports that he does not use drugs.    Allergies  No Known Allergies    Medications  Prior to Admission Medications   Prescriptions Last Dose Informant Patient Reported? Taking?   albuterol 108 (90 Base) MCG/ACT Aero Soln inhalation aerosol    Yes No   Sig: Inhale 2 Puffs by mouth every 6 hours as needed for Shortness of Breath.   atorvastatin (LIPITOR) 80 MG tablet   Yes No   Sig: Take 80 mg by mouth every evening.   clopidogrel (PLAVIX) 75 MG Tab   Yes No   Sig: Take 75 mg by mouth every day.   fluticasone (FLOVENT HFA) 110 MCG/ACT Aerosol   Yes No   Sig: Inhale 2 Puffs by mouth 2 times a day.   levothyroxine (SYNTHROID) 175 MCG Tab   Yes No   Sig: Take 175 mcg by mouth Every morning on an empty stomach.   losartan (COZAAR) 25 MG Tab   No No   Sig: Take 1 Tab by mouth every day.   omeprazole (PRILOSEC) 20 MG Tablet Delayed Response delayed-release tablet   Yes No   Sig: Take  by mouth.      Facility-Administered Medications: None       Physical Exam  Temp:  [36.4 °C (97.5 °F)] 36.4 °C (97.5 °F)  Pulse:  [72-80] 78  Resp:  [14-16] 16  BP: (134-147)/() 141/102  SpO2:  [95 %-96 %] 96 %    Physical Exam  Vitals signs reviewed.   Constitutional:       General: He is in acute distress.      Appearance: He is not ill-appearing.   HENT:      Head: Normocephalic and atraumatic.      Nose: No congestion.      Mouth/Throat:      Mouth: Mucous membranes are moist.   Eyes:      Extraocular Movements: Extraocular movements intact.      Pupils: Pupils are equal, round, and reactive to light.   Neck:      Musculoskeletal: Neck supple.   Cardiovascular:      Rate and Rhythm: Normal rate and regular rhythm.      Pulses: Normal pulses.      Heart sounds: Normal heart sounds.   Pulmonary:      Effort: Pulmonary effort is normal. No respiratory distress.      Breath sounds: Normal breath sounds. No wheezing.      Comments: Coughing at bedside   Abdominal:      General: Bowel sounds are normal. There is no distension.      Palpations: Abdomen is soft.      Tenderness: There is no tenderness.   Musculoskeletal:         General: No swelling.   Skin:     General: Skin is warm and dry.      Capillary Refill: Capillary refill takes less than 2 seconds.   Neurological:       Mental Status: He is alert.      Comments: Aphasic    Psychiatric:      Comments: Unable to determine due to aphasia          Laboratory:  Recent Labs     12/03/19  1758   WBC 8.0   RBC 5.98   HEMOGLOBIN 18.0   HEMATOCRIT 53.7*   MCV 89.8   MCH 30.1   MCHC 33.5*   RDW 42.3   PLATELETCT 286   MPV 8.6*     Recent Labs     12/03/19  1758   SODIUM 138   POTASSIUM 4.3   CHLORIDE 107   CO2 22   GLUCOSE 87   BUN 17   CREATININE 0.89   CALCIUM 9.4     Recent Labs     12/03/19  1758   ALTSGPT 33   ASTSGOT 22   ALKPHOSPHAT 96   TBILIRUBIN 0.4   GLUCOSE 87         No results for input(s): NTPROBNP in the last 72 hours.      No results for input(s): TROPONINT in the last 72 hours.    Urinalysis:    No results found     Imaging:  DX-CHEST-PORTABLE (1 VIEW)    (Results Pending)         Assessment/Plan:  I anticipate this patient is appropriate for observation status at this time.    * Cough- (present on admission)  Assessment & Plan  Unclear etiology potential aspiration pneumonia?   High aspiration risk, needs speech evaluation   Stop his ace and swtich to arb  On omeprazole  Trial his loratadine   Empiric unasyn for now given concerns of aspiration, CXR ordered    PAF (paroxysmal atrial fibrillation) (HCC)- (present on admission)  Assessment & Plan  Started on eliquis per cardiology recs    History of CVA (cerebrovascular accident)- (present on admission)  Assessment & Plan  On plavix and statin   Resultant aphasia    Essential hypertension, benign- (present on admission)  Assessment & Plan  Resume home BP medications, change ace to arb given cough     History of hypothyroidism- (present on admission)  Assessment & Plan  Resume home levothyroxine     Dyslipidemia- (present on admission)  Assessment & Plan  High dose statin therapy      VTE prophylaxis: eliquis

## 2019-12-04 NOTE — CARE PLAN
Problem: Communication  Goal: The ability to communicate needs accurately and effectively will improve  Outcome: PROGRESSING AS EXPECTED  Intervention: [place] patient to call light to alert staff of needs. Reorient patient to environment as needed  Intervention: Educate patient and significant other/support system about the plan of care, procedures, treatments, medications and allow for questions. Develop alternate methods of communication with patient and significant other/support system     Problem: Risk for Aspiration secondary to Swallowing Difficulty. Risk for Ineffective Airway Clearance  Goal: Will remain free from injury. Will maintain patent airway  Outcome: PROGRESSING AS EXPECTEDInterventions:  Strict NPO observed per orders. Aspiration precautions in place.  Collaborate with speech therapy (SLP eval orders in place.

## 2019-12-04 NOTE — CARE PLAN
Problem: Risk of Aspiration  Goal: Absence of aspiration  Outcome: PROGRESSING AS EXPECTED  Intervention: NPO until medically cleared. Aspiration precautions observed. Swallowing Assessment: SLP evaluation orders in place      Problem: Oxygenation/Respiratory Function  Goal: Patient will Achieve/Maintain Optimum Respiratory Rate/Effort  Outcome: PROGRESSING AS EXPECTED  Intervention: Assess/Monitor Rate/Rhythm/Depth of effort of respiration. Assess Oxygenation as Ordered Position Patient for Maximum Ventilatory Efficiency  Turn, Cough, and Deep Breathe                    Problem: Communication  Goal: The ability to communicate needs accurately and effectively will improve  Outcome: PROGRESSING AS EXPECTED  Intervention: [place] patient and significant other/support system to call light to alert staff of needs. Reorient patient to environment as needed. Educate patient and significant other/support system about the plan of care, procedures, treatments, medications and allow for questions

## 2019-12-04 NOTE — PROGRESS NOTES
Assist RN: D/c instructions given includes follow up, medications, diet (dysphagia 3, thick liquids), activity. Belongings with the pt/family. Home with wife.

## 2019-12-04 NOTE — PROGRESS NOTES
Patient awake in bed, calm and alert, AOx2 (baseline), verbally responsive, slow to respond, speech clear, follows to commands. No coughing noted. Patient denies any dyspea or SOB. No signs of distress.     Perineal care and partial linen change provided, Comfort and safety ensured.

## 2019-12-04 NOTE — ASSESSMENT & PLAN NOTE
Unclear etiology potential aspiration pneumonia?   High aspiration risk, needs speech evaluation   Stop his ace and swtich to arb  On omeprazole  Trial his loratadine   Empiric unasyn for now given concerns of aspiration, CXR ordered

## 2019-12-04 NOTE — THERAPY
"Speech Language Therapy Clinical Swallow Evaluation completed.  Functional Status: Patient was seen for clinical swallow evaluation this date. The patient was awake, alert with noted expressive asphasia but able to make needs known. Patient was noted to have persistent throat clearing throughout evaluation but reported improvement in coughing over night. The patient was able to follow oral motor directives with subtle labial asymmetry which appears consistent with baseline report. The patient was given PO trials of ice chips, thins, purees, solids, mixed consistency solids and dry solids. Patient consumed ~2oz of thin liquids with no overt s/s of aspiration however, was noted to exhibit oral holding of ~5seconds. Patient had x3 episodes of coughing on additional PO trials of thin liquids. Nectar thick liquids were trialed with elimination of oral holding and coughing. Discussed with patient. Patient in agreement with trialing nectar thick liquids. SLP following closely during acute care stay. Consider out patient/home health follow up if patient discharges prior to SLP follow up.     Recommendations - Diet:  Dysphagia III, Nectar Thick Liquid                          Strategies: Monitor during meals and Head of Bed at 90 Degrees                          Medication Administration: Whole with Liquid Wash(use room temp water or coffee)    Plan of Care: Will benefit from Speech Therapy 2 times per week  Post-Acute Therapy: Recommend outpatient or home health transitional care services for continued speech therapy services      See \"Rehab Therapy-Acute\" Patient Summary Report for complete documentation.   "

## 2019-12-04 NOTE — PROGRESS NOTES
Assessment completed. Pt A&Ox3 with expressive aphasia from previous CVA. Respirations are even and unlabored on RA. Pt denies pain at this time. Monitors applied, VS stable, call light and belongings within reach. POC updated (medication adjustments, discharge). Pt educated on room and call light, pt verbalized understanding. Communication board updated. Needs met.

## 2019-12-04 NOTE — ED NOTES
{Pre notes: Cards requested pt stop taking lisinopril related to possible chronic cough. They are going to start him on eliquis for afib - order was written in office today.

## 2019-12-04 NOTE — DISCHARGE PLANNING
Care Transition Team Assessment    Spoke with patient at bedside. Slow to answer. Lives with spouse in [place]. Spouse will be ride @ D/C. Unknown needs @ present time.    Information Source  Orientation : Disoriented to Event  Information Given By: Patient    Readmission Evaluation  Is this a readmission?: No    Interdisciplinary Discharge Planning  Primary Care Physician: Dell Moore  Lives with - Patient's Self Care Capacity: Spouse  Patient or legal guardian wants to designate a caregiver (see row info): No(Friend Caregiver: Darlene)  Support Systems: Children, Spouse / Significant Other  Housing / Facility: 1 Story Apartment / Condo  Do You Take your Prescribed Medications Regularly: Yes  Able to Return to Previous ADL's: Yes  Mobility Issues: Yes  Prior Services: Home-Independent  Patient Expects to be Discharged to:: Home  Assistance Needed: No  Durable Medical Equipment: Other - Specify(Cane)    Discharge Preparedness  What are your discharge supports?: Child, Spouse  Prior Functional Level: Uses Cane    Functional Assesment  Prior Functional Level: Uses Cane    Finances  Prescription Coverage: Yes    Discharge Risks or Barriers  Patient risk factors: Vulnerable adult    Anticipated Discharge Information  Anticipated discharge disposition: Home  Discharge Address: [address] [place] [address]   Discharge Contact Phone Number: [phone]

## 2019-12-04 NOTE — ED PROVIDER NOTES
ED PROVIDER NOTE     Scribed for William Yi M.D. by Swetha Varela. 12/3/2019, 8:59 PM.    CHIEF COMPLAINT  Chief Complaint   Patient presents with   • Cough     Pt reports cough for wks/ productive white/clear sputum. mild increased SOB. pt sent by MD for osorio zabala after chest x ray and interrogation of pacer. per family pt having an increased difficult time managing secreations.    • Shortness of Breath   • Sent by MD       HPI    Primary care provider: Dell Moore N.P.  Means of arrival: Walk-in  History obtained from: Patient's wife and Family friend  History limited by: Patient's baseline expressive aphasia    Segundo Falk is a 79 y.o. male with asthma who presents with a constant cough onset 2-3 months ago. The patient has an associated symptom of intermittent nausea and shortness of breath especially while coughing. Per wife, the patient often coughs until he turns red and reaches a near syncopal state. Per wife, they have recently moved to [place] 3 months ago from [facility]. The patient smoked for 35 years, and stopped about 10 years ago. The patient takes lisinopril daily, and has already took one this morning, he was recommended to consider changing this to an angiotensin receptor blocker as this is contributing to his cough. The patient has a history of a stroke and water on the brain and has had an expressive aphasia for the last 4 years, this is been unchanged per patient and family friend.  He lives at a senior living facility and has been evaluated for this multiple times without clear etiology, he was started on albuterol which has not given significant relief.  No aggravating factors but his cough is been constant and worsening.  Occasionally has chills.  He has no productive cough or hemoptysis.  Occasionally he appears to almost have posttussive emesis but he has not vomited today.    REVIEW OF SYSTEMS  Constitutional: Negative for fever but positive for occasional chills.  HENT:  Negative for rhinorrhea or sore throat.    Respiratory:  Positive for cough and shortness of breath.   Cardiovascular: Negative for chest pain but positive for occasional near syncope when coughing  Gastrointestinal: Positive for occasional nausea, no vomiting or abdominal pain.  Musculoskeletal: Negative for back pain or joint pain or extremities.   Skin: Negative for itching or rash.   Neurological: Negative for sensory or motor changes.   All other systems reviewed and are negative.    PAST MEDICAL HISTORY   has a past medical history of Cough (12/3/2019), Dyslipidemia (12/3/2019), Essential hypertension, benign (12/3/2019), History of CVA (cerebrovascular accident) (12/3/2019), History of hypothyroidism (12/3/2019), History of tobacco use (12/3/2019), PAF (paroxysmal atrial fibrillation) ([facility]) (12/3/2019), and Status post placement of implantable loop recorder (12/3/2019).    PAST FAMILY HISTORY  Family History   Problem Relation Age of Onset   • Heart Disease Neg Hx        SOCIAL HISTORY  Social History     Tobacco Use   • Smoking status: Former Smoker     Packs/day: 0.00     Years: 30.00     Pack years: 0.00     Types: Pipe     Last attempt to quit: 12/3/2009     Years since quitting: 10.0   • Smokeless tobacco: Never Used   Substance and Sexual Activity   • Alcohol use: Yes   • Drug use: Never   • Sexual activity: Not on file       SURGICAL HISTORY  patient denies any surgical history    CURRENT MEDICATIONS  Home Medications     Reviewed by Adán Moy (Pharmacy Tech) on 12/03/19 at 3727  Med List Status: Complete   Medication Last Dose Status   albuterol 108 (90 Base) MCG/ACT Aero Soln inhalation aerosol 12/3/2019 Active   atorvastatin (LIPITOR) 80 MG tablet 12/2/2019 Active   clopidogrel (PLAVIX) 75 MG Tab 12/3/2019 Active   fluticasone (FLOVENT HFA) 110 MCG/ACT Aerosol 12/3/2019 Active   levothyroxine (SYNTHROID) 175 MCG Tab 12/3/2019 Active   lisinopril (PRINIVIL) 5 MG Tab 12/3/2019 Active  "  omeprazole (PRILOSEC) 20 MG Tablet Delayed Response delayed-release tablet 12/3/2019 Active                ALLERGIES  No Known Allergies    PHYSICAL EXAM  VITAL SIGNS: /95   Pulse 73   Temp 36.4 °C (97.5 °F) (Temporal)   Resp 14   Ht 1.702 m (5' 7\")   Wt 80.7 kg (178 lb)   SpO2 95%   BMI 27.88 kg/m²    Pulse ox interpretation: On room air, I interpret this pulse ox as normal.  Constitutional: Sitting up in mild distress.   HEENT: Normocephalic, atraumatic. Posterior pharynx clear, mucous membranes slightly dry.  Eyes:  EOMI. Normal sclerae.  Neck: Supple, nontender.  Chest/Pulmonary: Lungs diminished in all lung fields, coarse lung sounds throughout, coughing frequently during evaluation.  Cardiovascular: Regular rate and rhythm, no obvious murmur.   Abdomen: Soft, nontender; no rebound, guarding, or masses.  Back: No CVA or midline tenderness.   Musculoskeletal:No bilateral leg swelling   Neuro: Alert, stable expressive aphasia, no focal weakness, per family at baseline neurologic/mental status.   Psych: Pleasant and cooperative.  Skin: No rashes, warm and dry.    DIAGNOSTIC STUDIES / PROCEDURES    LABS & EKG  Results for orders placed or performed during the hospital encounter of 12/03/19   CBC WITH DIFFERENTIAL   Result Value Ref Range    WBC 8.0 4.8 - 10.8 K/uL    RBC 5.98 4.70 - 6.10 M/uL    Hemoglobin 18.0 14.0 - 18.0 g/dL    Hematocrit 53.7 (H) 42.0 - 52.0 %    MCV 89.8 81.4 - 97.8 fL    MCH 30.1 27.0 - 33.0 pg    MCHC 33.5 (L) 33.7 - 35.3 g/dL    RDW 42.3 35.9 - 50.0 fL    Platelet Count 286 164 - 446 K/uL    MPV 8.6 (L) 9.0 - 12.9 fL    Neutrophils-Polys 61.50 44.00 - 72.00 %    Lymphocytes 26.50 22.00 - 41.00 %    Monocytes 6.80 0.00 - 13.40 %    Eosinophils 4.40 0.00 - 6.90 %    Basophils 0.50 0.00 - 1.80 %    Immature Granulocytes 0.30 0.00 - 0.90 %    Nucleated RBC 0.00 /100 WBC    Neutrophils (Absolute) 4.93 1.82 - 7.42 K/uL    Lymphs (Absolute) 2.12 1.00 - 4.80 K/uL    Monos (Absolute) " 0.54 0.00 - 0.85 K/uL    Eos (Absolute) 0.35 0.00 - 0.51 K/uL    Baso (Absolute) 0.04 0.00 - 0.12 K/uL    Immature Granulocytes (abs) 0.02 0.00 - 0.11 K/uL    NRBC (Absolute) 0.00 K/uL   COMP METABOLIC PANEL   Result Value Ref Range    Sodium 138 135 - 145 mmol/L    Potassium 4.3 3.6 - 5.5 mmol/L    Chloride 107 96 - 112 mmol/L    Co2 22 20 - 33 mmol/L    Anion Gap 9.0 0.0 - 11.9    Glucose 87 65 - 99 mg/dL    Bun 17 8 - 22 mg/dL    Creatinine 0.89 0.50 - 1.40 mg/dL    Calcium 9.4 8.5 - 10.5 mg/dL    AST(SGOT) 22 12 - 45 U/L    ALT(SGPT) 33 2 - 50 U/L    Alkaline Phosphatase 96 30 - 99 U/L    Total Bilirubin 0.4 0.1 - 1.5 mg/dL    Albumin 4.2 3.2 - 4.9 g/dL    Total Protein 7.0 6.0 - 8.2 g/dL    Globulin 2.8 1.9 - 3.5 g/dL    A-G Ratio 1.5 g/dL   ESTIMATED GFR   Result Value Ref Range    GFR If African American >60 >60 mL/min/1.73 m 2    GFR If Non African American >60 >60 mL/min/1.73 m 2   BLOOD CULTURE x2   Result Value Ref Range    Significant Indicator NEG     Source BLD     Site PERIPHERAL     Culture Result       No Growth  Note: Blood cultures are incubated for 5 days and  are monitored continuously.Positive blood cultures  are called to the RN and reported as soon as  they are identified.     BLOOD CULTURE x2   Result Value Ref Range    Significant Indicator NEG     Source BLD     Site PERIPHERAL     Culture Result       No Growth  Note: Blood cultures are incubated for 5 days and  are monitored continuously.Positive blood cultures  are called to the RN and reported as soon as  they are identified.     PROCALCITONIN   Result Value Ref Range    Procalcitonin <0.05 <0.25 ng/mL   CBC with Differential   Result Value Ref Range    WBC 9.5 4.8 - 10.8 K/uL    RBC 5.45 4.70 - 6.10 M/uL    Hemoglobin 16.0 14.0 - 18.0 g/dL    Hematocrit 48.9 42.0 - 52.0 %    MCV 89.7 81.4 - 97.8 fL    MCH 29.4 27.0 - 33.0 pg    MCHC 32.7 (L) 33.7 - 35.3 g/dL    RDW 41.6 35.9 - 50.0 fL    Platelet Count 252 164 - 446 K/uL    MPV 8.9  (L) 9.0 - 12.9 fL    Neutrophils-Polys 66.60 44.00 - 72.00 %    Lymphocytes 22.00 22.00 - 41.00 %    Monocytes 6.30 0.00 - 13.40 %    Eosinophils 4.30 0.00 - 6.90 %    Basophils 0.50 0.00 - 1.80 %    Immature Granulocytes 0.30 0.00 - 0.90 %    Nucleated RBC 0.00 /100 WBC    Neutrophils (Absolute) 6.29 1.82 - 7.42 K/uL    Lymphs (Absolute) 2.08 1.00 - 4.80 K/uL    Monos (Absolute) 0.60 0.00 - 0.85 K/uL    Eos (Absolute) 0.41 0.00 - 0.51 K/uL    Baso (Absolute) 0.05 0.00 - 0.12 K/uL    Immature Granulocytes (abs) 0.03 0.00 - 0.11 K/uL    NRBC (Absolute) 0.00 K/uL   Comp Metabolic Panel (CMP)   Result Value Ref Range    Sodium 139 135 - 145 mmol/L    Potassium 4.1 3.6 - 5.5 mmol/L    Chloride 110 96 - 112 mmol/L    Co2 22 20 - 33 mmol/L    Anion Gap 7.0 0.0 - 11.9    Glucose 87 65 - 99 mg/dL    Bun 15 8 - 22 mg/dL    Creatinine 0.77 0.50 - 1.40 mg/dL    Calcium 8.4 (L) 8.5 - 10.5 mg/dL    AST(SGOT) 22 12 - 45 U/L    ALT(SGPT) 30 2 - 50 U/L    Alkaline Phosphatase 73 30 - 99 U/L    Total Bilirubin 0.5 0.1 - 1.5 mg/dL    Albumin 3.7 3.2 - 4.9 g/dL    Total Protein 5.8 (L) 6.0 - 8.2 g/dL    Globulin 2.1 1.9 - 3.5 g/dL    A-G Ratio 1.8 g/dL   Blood Culture,Hold   Result Value Ref Range    Blood Culture Hold Collected    ESTIMATED GFR   Result Value Ref Range    GFR If African American >60 >60 mL/min/1.73 m 2    GFR If Non African American >60 >60 mL/min/1.73 m 2   EKG   Result Value Ref Range    Report       [facility] Emergency Dept.    Test Date:  2019  Pt Name:    SOCRATES MELENDEZ                    Department: ER  MRN:        [id number]                      Room:       T209  Gender:     Male                         Technician: [id number]  :        1940                   Requested By:ER TRIAGE PROTOCOL  Order #:    [id number]                    Reading MD: William Yi MD    Measurements  Intervals                                Axis  Rate:       61                           P:          44  VA:          232                          QRS:        4  QRSD:       92                           T:          98  QT:         436  QTc:        440    Interpretive Statements  SINUS RHYTHM  FIRST DEGREE AV BLOCK  PROBABLE LEFT ATRIAL ABNORMALITY  Compared to ECG 12/03/2019 15:17:26  Stable EKG no STEMI  Electronically Signed On 12-4-2019 11:15:07 PST by William Yi MD         COURSE & MEDICAL DECISION MAKING    This is a 79 y.o. male who presents with a cough.    Differential Diagnosis includes but is not limited to:  Aspiration, pertussis, medication reaction, pneumonia, or COPD.    ED Course:  9:08 PM - Patient seen and examined at bedside. Discussed plan of care. Patient agrees to the plan of care. The patient will be medicated with Zithromax 500 mg, Unasyn 3 g, and Duoneb. Ordered for Cbc with differential, CMP, and EKG to evaluate his symptoms.     I reviewed the patient's chest x-ray done in cardiology clinic earlier today, read as no acute cardia pulmonary disease per radiologist but I see some infiltrates in his right base that are concerning for possible aspiration.  Wife and family member report that he does occasionally choke during swallowing liquids and I have high suspicion for expressive aphasia leading to some aspiration.  Given his cough is been worsening, I feel he merits emergent evaluation by speech-language pathology to see what liquids and foods are safe for him to swallow.  He said a persistent cough for months, pertussis has been in the community so I would like to empirically cover him for both aspiration pneumonia and possible pertussis with Unasyn and azithromycin.  Thankfully his lab work-up today is reassuring he has no fevers here his white count is normal.  Blood cultures will be obtained prior to administration of antibiotics.  Electrolytes are stable no acidosis no hypoxia, but given high risk of aspiration I feel he merits hospitalization for further work-up and treatment.    9:25 PM -  I spoke with Dr. Burns (Hospitalist) who has agreed to assume care of the patient and hospitalized him for further work-up and treatment.  The patient is hemodynamically stable for transfer to the hospital in guarded condition.    Medications   ampicillin/sulbactam (UNASYN) 3 g in  mL IVPB (0 g Intravenous Stopped 12/4/19 0810)   senna-docusate (PERICOLACE or SENOKOT S) 8.6-50 MG per tablet 2 Tab (0 Tabs Oral Held 12/4/19 0600)     And   polyethylene glycol/lytes (MIRALAX) PACKET 1 Packet (has no administration in time range)     And   magnesium hydroxide (MILK OF MAGNESIA) suspension 30 mL (has no administration in time range)     And   bisacodyl (DULCOLAX) suppository 10 mg (has no administration in time range)   ondansetron (ZOFRAN) syringe/vial injection 4 mg (has no administration in time range)   ondansetron (ZOFRAN ODT) dispertab 4 mg (has no administration in time range)   atorvastatin (LIPITOR) tablet 80 mg (0 mg Oral Held 12/3/19 2200)   clopidogrel (PLAVIX) tablet 75 mg (0 mg Oral Held 12/4/19 0600)   levothyroxine (SYNTHROID) tablet 175 mcg (0 mcg Oral Held 12/4/19 0600)   omeprazole (PRILOSEC) capsule 20 mg (0 mg Oral Held 12/4/19 0030)   apixaban (ELIQUIS) tablet 5 mg (0 mg Oral Held 12/4/19 0600)   losartan (COZAAR) tablet 25 mg (0 mg Oral Held 12/4/19 0600)   loratadine/pseudo SR (CLARITIN D) 5-120 MG tablet 1 Tab (0 Tabs Oral Held 12/4/19 0600)   azithromycin (ZITHROMAX) injection 500 mg (0 mg Intravenous Stopped 12/4/19 0230)   ampicillin/sulbactam (UNASYN) 3 g in  mL IVPB (0 g Intravenous Stopped 12/3/19 2317)   ipratropium-albuterol (DUONEB) nebulizer solution (3 mL Nebulization Given 12/3/19 2121)   diphenhydrAMINE (BENADRYL) injection 25 mg (25 mg Intravenous Given 12/4/19 2940)     FINAL IMPRESSION  1. Aspiration pneumonia of right lower lobe, unspecified aspiration pneumonia type (HCC)    2. Cough    3. Essential hypertension, benign    4. History of CVA (cerebrovascular accident)     5. PAF (paroxysmal atrial fibrillation) (HCC)    6. Expressive aphasia    7. Status post placement of implantable loop recorder    8. History of tobacco use    9. Shortness of breath        -Hospitalized for further work-up and treatment-      Pertinent Labs & Imaging studies reviewed and verified by myself, as well as nursing notes and the patient's past medical, family, and social histories (See chart for details).    Results, exam findings, clinical impression, presumed diagnosis, treatment options, and strict return precautions were discussed with the patient and family, and they verbalized understanding, agreed with, and appreciated the plan of care.    Swetha KANG (Hernandez), am scribing for, and in the presence of, William Yi M.D..    Electronically signed by: Swetha Kendall), 12/3/2019    William KANG M.D. personally performed the services described in this documentation, as scribed by Swetha Varela in my presence, and it is both accurate and complete. C.    Portions of this record were made with voice recognition software.  Despite my review, spelling/grammar/context errors may still remain.  Interpretation of this chart should be taken in this context.    The note accurately reflects work and decisions made by me.  William Yi  12/4/2019  11:21 AM

## 2019-12-04 NOTE — ED NOTES
Med rec updated and complete. Allergies reviewed. Pt received 2 new prescriptions today . One for eliquis and one for losartan.  Pt has been taking lisinopril 5 mg daily. Pt was told to stop taking lisinopril today per the doctor. No antibiotic use in last 14 days.  [facility] .

## 2019-12-04 NOTE — ED TRIAGE NOTES
Chief Complaint   Patient presents with   • Cough     Pt reports cough for wks/ productive white/clear sputum. mild increased SOB. pt sent by MD for furter eval after chest x ray and interrogation of pacer. per family pt having an increased difficult time managing secreations.    • Shortness of Breath   • Sent by MD     Explained to pt triage process, made pt aware to tell this RN/staff of any changes/concerns, pt verbalized understanding of process and instructions given. Pt to ER lobby.

## 2019-12-05 ENCOUNTER — TELEPHONE (OUTPATIENT)
Dept: CARDIOLOGY | Facility: MEDICAL CENTER | Age: 79
End: 2019-12-05

## 2019-12-05 NOTE — TELEPHONE ENCOUNTER
DIPTI/eliazar Castillo from [facility] calling to verify/clarify script for eliquis.  Please call Anna .

## 2019-12-08 LAB
BACTERIA BLD CULT: NORMAL
SIGNIFICANT IND 70042: NORMAL
SITE SITE: NORMAL
SOURCE SOURCE: NORMAL

## 2019-12-09 ENCOUNTER — TELEPHONE (OUTPATIENT)
Dept: CARDIOLOGY | Facility: MEDICAL CENTER | Age: 79
End: 2019-12-09

## 2019-12-09 LAB
BACTERIA BLD CULT: NORMAL
SIGNIFICANT IND 70042: NORMAL
SITE SITE: NORMAL
SOURCE SOURCE: NORMAL

## 2019-12-09 NOTE — TELEPHONE ENCOUNTER
All [facility] via fax  F: [phone]  T: [phone]    Fax Confirmed.

## 2019-12-18 ENCOUNTER — HOSPITAL ENCOUNTER (OUTPATIENT)
Dept: RADIOLOGY | Facility: MEDICAL CENTER | Age: 79
End: 2019-12-18
Attending: NURSE PRACTITIONER
Payer: MEDICARE

## 2019-12-18 ENCOUNTER — OFFICE VISIT (OUTPATIENT)
Dept: URGENT CARE | Facility: PHYSICIAN GROUP | Age: 79
End: 2019-12-18
Payer: MEDICARE

## 2019-12-18 VITALS
HEART RATE: 92 BPM | WEIGHT: 185 LBS | TEMPERATURE: 98.4 F | OXYGEN SATURATION: 96 % | RESPIRATION RATE: 12 BRPM | SYSTOLIC BLOOD PRESSURE: 108 MMHG | BODY MASS INDEX: 28.98 KG/M2 | DIASTOLIC BLOOD PRESSURE: 76 MMHG

## 2019-12-18 DIAGNOSIS — S49.92XA INJURY OF LEFT SHOULDER, INITIAL ENCOUNTER: ICD-10-CM

## 2019-12-18 PROCEDURE — 73030 X-RAY EXAM OF SHOULDER: CPT | Mod: LT

## 2019-12-18 PROCEDURE — 99203 OFFICE O/P NEW LOW 30 MIN: CPT | Performed by: NURSE PRACTITIONER

## 2019-12-18 ASSESSMENT — ENCOUNTER SYMPTOMS
HEADACHES: 0
BLURRED VISION: 0
FEVER: 0
WEAKNESS: 0
DIZZINESS: 0
TINGLING: 0
CHILLS: 0

## 2019-12-18 NOTE — PROGRESS NOTES
Subjective:      Segundo Falk is a 79 y.o. male who presents with Shoulder Injury            Shoulder Injury    The incident occurred at home. The left shoulder is affected. The incident occurred 12 to 24 hours ago. The injury mechanism was a fall. The quality of the pain is described as aching. The pain does not radiate. The pain is at a severity of 6/10. The pain is moderate. Pertinent negatives include no tingling. Associated symptoms comments: Patient brought in by wife and family friend who state that patient fell in the bathroom last night and landed on his left side on his shoulder. Patient states his pain is localized to the shoulder and does not radiate.  Denies any numbness or tingling in his left hand or pain in his elbow.. The symptoms are aggravated by movement. He has tried nothing for the symptoms. The treatment provided no relief.       Review of Systems   Constitutional: Negative for chills and fever.   Eyes: Negative for blurred vision.   Musculoskeletal: Positive for joint pain.        Left shoulder   Neurological: Negative for dizziness, tingling, weakness and headaches.     Past Medical History:   Diagnosis Date   • Cough 12/3/2019   • Dyslipidemia 12/3/2019   • Essential hypertension, benign 12/3/2019   • History of CVA (cerebrovascular accident) 12/3/2019   • History of hypothyroidism 12/3/2019   • History of tobacco use 12/3/2019   • PAF (paroxysmal atrial fibrillation) (Formerly [facility]) 12/3/2019   • Status post placement of implantable loop recorder 12/3/2019    History reviewed. No pertinent surgical history.   Social History     Socioeconomic History   • Marital status:      Spouse name: Not on file   • Number of children: Not on file   • Years of education: Not on file   • Highest education level: Not on file   Occupational History   • Not on file   Social Needs   • Financial resource strain: Not on file   • Food insecurity:     Worry: Not on file     Inability: Not on file   • Transportation  needs:     Medical: Not on file     Non-medical: Not on file   Tobacco Use   • Smoking status: Former Smoker     Packs/day: 0.00     Years: 30.00     Pack years: 0.00     Types: Pipe     Last attempt to quit: 12/3/2009     Years since quitting: 10.0   • Smokeless tobacco: Never Used   Substance and Sexual Activity   • Alcohol use: Yes   • Drug use: Never   • Sexual activity: Not on file   Lifestyle   • Physical activity:     Days per week: Not on file     Minutes per session: Not on file   • Stress: Not on file   Relationships   • Social connections:     Talks on phone: Not on file     Gets together: Not on file     Attends Restoration service: Not on file     Active member of club or organization: Not on file     Attends meetings of clubs or organizations: Not on file     Relationship status: Not on file   • Intimate partner violence:     Fear of current or ex partner: Not on file     Emotionally abused: Not on file     Physically abused: Not on file     Forced sexual activity: Not on file   Other Topics Concern   • Not on file   Social History Narrative   • Not on file    Allergies: Codeine         Objective:     /76 (BP Location: Left arm, Patient Position: Sitting, BP Cuff Size: Small adult)   Pulse 92   Temp 36.9 °C (98.4 °F) (Temporal)   Resp 12   Wt 83.9 kg (185 lb)   SpO2 96%   BMI 28.98 kg/m²      Physical Exam  Vitals signs reviewed.   Cardiovascular:      Pulses: Normal pulses.      Heart sounds: Normal heart sounds.   Pulmonary:      Effort: Pulmonary effort is normal.      Breath sounds: Normal breath sounds.   Musculoskeletal: Normal range of motion.         General: Tenderness present.      Right shoulder: He exhibits tenderness and pain. He exhibits no swelling.      Comments: Patient tender to palpation at AC joint. Patient has good ROM without pain. Negative Apley Scratch.  strength 5/5.    Skin:     General: Skin is warm.      Capillary Refill: Capillary refill takes less than 2  seconds.   Neurological:      Mental Status: He is alert and oriented to person, place, and time.   Psychiatric:         Mood and Affect: Mood normal.         Behavior: Behavior normal.         Thought Content: Thought content normal.         Judgment: Judgment normal.                 Assessment/Plan:       1. Injury of left shoulder, initial encounter  - DX-SHOULDER 2+ LEFT;   FINDINGS:  No acute fracture or dislocation. There is moderate degenerative change in the left acromioclavicular joint. There is mild osteophytic spurring the inferior glenohumeral joint.     IMPRESSION:     1.  No acute findings.     2.  Moderate degenerative change in the left acromioclavicular joint.     3.  Mild glenohumeral joint arthropathy.  Reviewed imaging, agree with Radiology. Disscused with patient and wife    - REFERRAL TO FOLLOW-UP WITH PRIMARY CARE    Discussed physical examination findings as well as benign x-ray are indicative of a left shoulder sprain/strain.  Instructed to treat with heat, ice, gentle stretching and elevation.  May use over-the-counter Tylenol per 's instructions.    Supportive care, differential diagnoses, and indications for immediate follow-up discussed with patient.    Pathogenesis of diagnosis discussed including typical length and natural progression. Patient expresses understanding and agrees to plan.       Please note that this dictation was created using voice recognition software. I have made every reasonable attempt to correct obvious errors, but I expect that there are errors of grammar and possibly content that I did not discover before finalizing the note.

## 2020-01-31 ENCOUNTER — APPOINTMENT (OUTPATIENT)
Dept: RADIOLOGY | Facility: MEDICAL CENTER | Age: 80
DRG: 071 | End: 2020-01-31
Attending: EMERGENCY MEDICINE
Payer: MEDICARE

## 2020-01-31 ENCOUNTER — HOSPITAL ENCOUNTER (INPATIENT)
Facility: MEDICAL CENTER | Age: 80
LOS: 9 days | DRG: 071 | End: 2020-02-10
Attending: EMERGENCY MEDICINE | Admitting: INTERNAL MEDICINE
Payer: MEDICARE

## 2020-01-31 DIAGNOSIS — R62.7 ADULT FAILURE TO THRIVE: ICD-10-CM

## 2020-01-31 DIAGNOSIS — G93.40 ENCEPHALOPATHY: ICD-10-CM

## 2020-01-31 DIAGNOSIS — R53.1 WEAKNESS: ICD-10-CM

## 2020-01-31 LAB
ALBUMIN SERPL BCP-MCNC: 4 G/DL (ref 3.2–4.9)
ALBUMIN/GLOB SERPL: 1.3 G/DL
ALP SERPL-CCNC: 85 U/L (ref 30–99)
ALT SERPL-CCNC: 25 U/L (ref 2–50)
ANION GAP SERPL CALC-SCNC: 9 MMOL/L (ref 0–11.9)
APPEARANCE UR: CLEAR
AST SERPL-CCNC: 23 U/L (ref 12–45)
BACTERIA #/AREA URNS HPF: NEGATIVE /HPF
BASOPHILS # BLD AUTO: 0.4 % (ref 0–1.8)
BASOPHILS # BLD: 0.03 K/UL (ref 0–0.12)
BILIRUB SERPL-MCNC: 0.7 MG/DL (ref 0.1–1.5)
BILIRUB UR QL STRIP.AUTO: NEGATIVE
BUN SERPL-MCNC: 17 MG/DL (ref 8–22)
CALCIUM SERPL-MCNC: 9.6 MG/DL (ref 8.5–10.5)
CHLORIDE SERPL-SCNC: 108 MMOL/L (ref 96–112)
CO2 SERPL-SCNC: 27 MMOL/L (ref 20–33)
COLOR UR: ABNORMAL
CREAT SERPL-MCNC: 0.96 MG/DL (ref 0.5–1.4)
EOSINOPHIL # BLD AUTO: 0.39 K/UL (ref 0–0.51)
EOSINOPHIL NFR BLD: 5 % (ref 0–6.9)
EPI CELLS #/AREA URNS HPF: NEGATIVE /HPF
ERYTHROCYTE [DISTWIDTH] IN BLOOD BY AUTOMATED COUNT: 43.9 FL (ref 35.9–50)
EST. AVERAGE GLUCOSE BLD GHB EST-MCNC: 111 MG/DL
ETHANOL BLD-MCNC: 0 G/DL
GLOBULIN SER CALC-MCNC: 3 G/DL (ref 1.9–3.5)
GLUCOSE SERPL-MCNC: 96 MG/DL (ref 65–99)
GLUCOSE UR STRIP.AUTO-MCNC: NEGATIVE MG/DL
HBA1C MFR BLD: 5.5 % (ref 0–5.6)
HCT VFR BLD AUTO: 52.1 % (ref 42–52)
HGB BLD-MCNC: 17.2 G/DL (ref 14–18)
HYALINE CASTS #/AREA URNS LPF: ABNORMAL /LPF
IMM GRANULOCYTES # BLD AUTO: 0.03 K/UL (ref 0–0.11)
IMM GRANULOCYTES NFR BLD AUTO: 0.4 % (ref 0–0.9)
KETONES UR STRIP.AUTO-MCNC: NEGATIVE MG/DL
LACTATE BLD-SCNC: 1.1 MMOL/L (ref 0.5–2)
LACTATE BLD-SCNC: 1.8 MMOL/L (ref 0.5–2)
LEUKOCYTE ESTERASE UR QL STRIP.AUTO: NEGATIVE
LYMPHOCYTES # BLD AUTO: 1.25 K/UL (ref 1–4.8)
LYMPHOCYTES NFR BLD: 16.1 % (ref 22–41)
MCH RBC QN AUTO: 29.7 PG (ref 27–33)
MCHC RBC AUTO-ENTMCNC: 33 G/DL (ref 33.7–35.3)
MCV RBC AUTO: 90 FL (ref 81.4–97.8)
MICRO URNS: ABNORMAL
MONOCYTES # BLD AUTO: 0.9 K/UL (ref 0–0.85)
MONOCYTES NFR BLD AUTO: 11.6 % (ref 0–13.4)
NEUTROPHILS # BLD AUTO: 5.16 K/UL (ref 1.82–7.42)
NEUTROPHILS NFR BLD: 66.5 % (ref 44–72)
NITRITE UR QL STRIP.AUTO: NEGATIVE
NRBC # BLD AUTO: 0 K/UL
NRBC BLD-RTO: 0 /100 WBC
PH UR STRIP.AUTO: 5.5 [PH] (ref 5–8)
PLATELET # BLD AUTO: 274 K/UL (ref 164–446)
PMV BLD AUTO: 9 FL (ref 9–12.9)
POTASSIUM SERPL-SCNC: 3.9 MMOL/L (ref 3.6–5.5)
PROT SERPL-MCNC: 7 G/DL (ref 6–8.2)
PROT UR QL STRIP: 100 MG/DL
RBC # BLD AUTO: 5.79 M/UL (ref 4.7–6.1)
RBC # URNS HPF: ABNORMAL /HPF
RBC UR QL AUTO: NEGATIVE
SODIUM SERPL-SCNC: 144 MMOL/L (ref 135–145)
SP GR UR STRIP.AUTO: 1.03
UROBILINOGEN UR STRIP.AUTO-MCNC: 1 MG/DL
WBC # BLD AUTO: 7.8 K/UL (ref 4.8–10.8)
WBC #/AREA URNS HPF: ABNORMAL /HPF

## 2020-01-31 PROCEDURE — 99220 PR INITIAL OBSERVATION CARE,LEVL III: CPT | Performed by: INTERNAL MEDICINE

## 2020-01-31 PROCEDURE — 81001 URINALYSIS AUTO W/SCOPE: CPT

## 2020-01-31 PROCEDURE — 94760 N-INVAS EAR/PLS OXIMETRY 1: CPT

## 2020-01-31 PROCEDURE — 83036 HEMOGLOBIN GLYCOSYLATED A1C: CPT

## 2020-01-31 PROCEDURE — 71045 X-RAY EXAM CHEST 1 VIEW: CPT

## 2020-01-31 PROCEDURE — 83605 ASSAY OF LACTIC ACID: CPT

## 2020-01-31 PROCEDURE — 82552 ASSAY OF CPK IN BLOOD: CPT

## 2020-01-31 PROCEDURE — G0378 HOSPITAL OBSERVATION PER HR: HCPCS

## 2020-01-31 PROCEDURE — 700105 HCHG RX REV CODE 258: Performed by: INTERNAL MEDICINE

## 2020-01-31 PROCEDURE — 99285 EMERGENCY DEPT VISIT HI MDM: CPT

## 2020-01-31 PROCEDURE — 87086 URINE CULTURE/COLONY COUNT: CPT

## 2020-01-31 PROCEDURE — 80053 COMPREHEN METABOLIC PANEL: CPT

## 2020-01-31 PROCEDURE — 700102 HCHG RX REV CODE 250 W/ 637 OVERRIDE(OP): Performed by: INTERNAL MEDICINE

## 2020-01-31 PROCEDURE — 70450 CT HEAD/BRAIN W/O DYE: CPT

## 2020-01-31 PROCEDURE — 82550 ASSAY OF CK (CPK): CPT

## 2020-01-31 PROCEDURE — 80307 DRUG TEST PRSMV CHEM ANLYZR: CPT

## 2020-01-31 PROCEDURE — A9270 NON-COVERED ITEM OR SERVICE: HCPCS | Performed by: INTERNAL MEDICINE

## 2020-01-31 PROCEDURE — 85025 COMPLETE CBC W/AUTO DIFF WBC: CPT

## 2020-01-31 RX ORDER — SODIUM CHLORIDE 9 MG/ML
INJECTION, SOLUTION INTRAVENOUS CONTINUOUS
Status: DISCONTINUED | OUTPATIENT
Start: 2020-01-31 | End: 2020-02-02

## 2020-01-31 RX ORDER — ASPIRIN 81 MG/1
324 TABLET, CHEWABLE ORAL DAILY
Status: DISCONTINUED | OUTPATIENT
Start: 2020-02-01 | End: 2020-02-02

## 2020-01-31 RX ORDER — CLOPIDOGREL BISULFATE 75 MG/1
75 TABLET ORAL DAILY
Status: DISCONTINUED | OUTPATIENT
Start: 2020-02-01 | End: 2020-02-02

## 2020-01-31 RX ORDER — FLUTICASONE PROPIONATE 110 UG/1
2 AEROSOL, METERED RESPIRATORY (INHALATION)
Status: DISCONTINUED | OUTPATIENT
Start: 2020-01-31 | End: 2020-02-05 | Stop reason: ALTCHOICE

## 2020-01-31 RX ORDER — MONTELUKAST SODIUM 10 MG/1
10 TABLET ORAL DAILY
COMMUNITY

## 2020-01-31 RX ORDER — ERGOCALCIFEROL 1.25 MG/1
50000 CAPSULE ORAL
Status: ON HOLD | COMMUNITY
End: 2020-02-10

## 2020-01-31 RX ORDER — ASPIRIN 300 MG/1
300 SUPPOSITORY RECTAL DAILY
Status: DISCONTINUED | OUTPATIENT
Start: 2020-02-01 | End: 2020-02-02

## 2020-01-31 RX ORDER — ONDANSETRON 4 MG/1
4 TABLET, ORALLY DISINTEGRATING ORAL EVERY 4 HOURS PRN
Status: DISCONTINUED | OUTPATIENT
Start: 2020-01-31 | End: 2020-02-02

## 2020-01-31 RX ORDER — ONDANSETRON 2 MG/ML
4 INJECTION INTRAMUSCULAR; INTRAVENOUS EVERY 4 HOURS PRN
Status: DISCONTINUED | OUTPATIENT
Start: 2020-01-31 | End: 2020-02-10 | Stop reason: HOSPADM

## 2020-01-31 RX ORDER — BISACODYL 10 MG
10 SUPPOSITORY, RECTAL RECTAL
Status: DISCONTINUED | OUTPATIENT
Start: 2020-01-31 | End: 2020-02-02

## 2020-01-31 RX ORDER — ERGOCALCIFEROL 1.25 MG/1
50000 CAPSULE ORAL
Status: DISCONTINUED | OUTPATIENT
Start: 2020-02-05 | End: 2020-02-04

## 2020-01-31 RX ORDER — ATORVASTATIN CALCIUM 80 MG/1
80 TABLET, FILM COATED ORAL NIGHTLY
Status: DISCONTINUED | OUTPATIENT
Start: 2020-01-31 | End: 2020-02-02

## 2020-01-31 RX ORDER — OMEPRAZOLE 20 MG/1
20 CAPSULE, DELAYED RELEASE ORAL DAILY
Status: DISCONTINUED | OUTPATIENT
Start: 2020-02-01 | End: 2020-02-02

## 2020-01-31 RX ORDER — AMOXICILLIN 250 MG
2 CAPSULE ORAL 2 TIMES DAILY
Status: DISCONTINUED | OUTPATIENT
Start: 2020-01-31 | End: 2020-02-02

## 2020-01-31 RX ORDER — POLYETHYLENE GLYCOL 3350 17 G/17G
1 POWDER, FOR SOLUTION ORAL
Status: DISCONTINUED | OUTPATIENT
Start: 2020-01-31 | End: 2020-02-02

## 2020-01-31 RX ORDER — LOSARTAN POTASSIUM 50 MG/1
25 TABLET ORAL DAILY
Status: DISCONTINUED | OUTPATIENT
Start: 2020-02-01 | End: 2020-02-02

## 2020-01-31 RX ORDER — ASPIRIN 325 MG
325 TABLET ORAL DAILY
Status: DISCONTINUED | OUTPATIENT
Start: 2020-02-01 | End: 2020-02-02

## 2020-01-31 RX ORDER — MONTELUKAST SODIUM 10 MG/1
10 TABLET ORAL DAILY
Status: DISCONTINUED | OUTPATIENT
Start: 2020-02-01 | End: 2020-02-02

## 2020-01-31 RX ORDER — ALBUTEROL SULFATE 90 UG/1
2 AEROSOL, METERED RESPIRATORY (INHALATION) EVERY 6 HOURS PRN
Status: DISCONTINUED | OUTPATIENT
Start: 2020-01-31 | End: 2020-02-10 | Stop reason: HOSPADM

## 2020-01-31 RX ADMIN — SENNOSIDES AND DOCUSATE SODIUM 2 TABLET: 8.6; 5 TABLET ORAL at 18:15

## 2020-01-31 RX ADMIN — SODIUM CHLORIDE: 9 INJECTION, SOLUTION INTRAVENOUS at 21:40

## 2020-01-31 ASSESSMENT — LIFESTYLE VARIABLES
TOTAL SCORE: 0
HAVE YOU EVER FELT YOU SHOULD CUT DOWN ON YOUR DRINKING: NO
TOTAL SCORE: 0
DO YOU DRINK ALCOHOL: NO
ON A TYPICAL DAY WHEN YOU DRINK ALCOHOL HOW MANY DRINKS DO YOU HAVE: 2
DOES PATIENT WANT TO STOP DRINKING: NO
ALCOHOL_USE: YES
EVER_SMOKED: NEVER
EVER HAD A DRINK FIRST THING IN THE MORNING TO STEADY YOUR NERVES TO GET RID OF A HANGOVER: NO
TOTAL SCORE: 0
TOTAL SCORE: 0
EVER HAD A DRINK FIRST THING IN THE MORNING TO STEADY YOUR NERVES TO GET RID OF A HANGOVER: NO
EVER FELT BAD OR GUILTY ABOUT YOUR DRINKING: NO
DOES PATIENT WANT TO STOP DRINKING: NO
EVER FELT BAD OR GUILTY ABOUT YOUR DRINKING: NO
AVERAGE NUMBER OF DAYS PER WEEK YOU HAVE A DRINK CONTAINING ALCOHOL: 7
HAVE PEOPLE ANNOYED YOU BY CRITICIZING YOUR DRINKING: NO
HAVE YOU EVER FELT YOU SHOULD CUT DOWN ON YOUR DRINKING: NO
EVER_SMOKED: NEVER
CONSUMPTION TOTAL: INCOMPLETE
HOW MANY TIMES IN THE PAST YEAR HAVE YOU HAD 5 OR MORE DRINKS IN A DAY: 0
HAVE PEOPLE ANNOYED YOU BY CRITICIZING YOUR DRINKING: NO
TOTAL SCORE: 0
CONSUMPTION TOTAL: NEGATIVE
TOTAL SCORE: 0

## 2020-01-31 ASSESSMENT — COGNITIVE AND FUNCTIONAL STATUS - GENERAL
CLIMB 3 TO 5 STEPS WITH RAILING: A LITTLE
MOVING FROM LYING ON BACK TO SITTING ON SIDE OF FLAT BED: A LITTLE
SUGGESTED CMS G CODE MODIFIER MOBILITY: CK
DAILY ACTIVITIY SCORE: 18
TOILETING: A LITTLE
EATING MEALS: A LITTLE
MOVING TO AND FROM BED TO CHAIR: A LITTLE
DRESSING REGULAR LOWER BODY CLOTHING: A LITTLE
MOBILITY SCORE: 18
DRESSING REGULAR UPPER BODY CLOTHING: A LITTLE
SUGGESTED CMS G CODE MODIFIER DAILY ACTIVITY: CK
WALKING IN HOSPITAL ROOM: A LITTLE
STANDING UP FROM CHAIR USING ARMS: A LITTLE
HELP NEEDED FOR BATHING: A LITTLE
TURNING FROM BACK TO SIDE WHILE IN FLAT BAD: A LITTLE
PERSONAL GROOMING: A LITTLE

## 2020-01-31 ASSESSMENT — COPD QUESTIONNAIRES
HAVE YOU SMOKED AT LEAST 100 CIGARETTES IN YOUR ENTIRE LIFE: NO/DON'T KNOW
DURING THE PAST 4 WEEKS HOW MUCH DID YOU FEEL SHORT OF BREATH: SOME OF THE TIME
COPD SCREENING SCORE: 3
DO YOU EVER COUGH UP ANY MUCUS OR PHLEGM?: NO/ONLY WITH OCCASIONAL COLDS OR INFECTIONS

## 2020-01-31 NOTE — ED NOTES
Med rec completed per pt's daughter   Allergies reviewed  No PO antibiotics in the last 14 days    Pt's daughter states that pt's wife is suppose to be giving the pt his daily medications, but daughter states that she is unsure if the wife has been giving the pt his meds at all or too much of his medications  Last doses are unknown   Daughter states that wife states she often forgets to give the morning meds and gives all meds in the evening

## 2020-01-31 NOTE — ED TRIAGE NOTES
BIB [facility] for increased weakness over the last 3 days.  Per pt's daughter, pt has hx of stroke and over the last 3 days speech is more delayed and pt needs more assistance with walking, pt lives at home with his wife but wife has dementia and unable to care for pt.  Pt A&0 x4, denies any complaints

## 2020-01-31 NOTE — ED PROVIDER NOTES
ED Provider Note    CHIEF COMPLAINT  Chief Complaint   Patient presents with   • Weakness       HPI  Segundo Flak is a 79 y.o. male who presents to the emergency department complaining of weakness.  The patient is brought in by EMS sent in by his family for weakness.  The patient is awake and alert and is oriented to where he is and the year.  Cannot really tell me why he is here.  He denies any specific complaints of pain denies any focal weakness.  Per report this is been progressive for the last several days.  At least last 3 days.    No other issues obtainable because of his mental status.    REVIEW OF SYSTEMS  See HPI for further details.  Unable to obtain secondary to the patient's mental status.    PAST MEDICAL HISTORY  Past Medical History:   Diagnosis Date   • Cough 12/3/2019   • Dyslipidemia 12/3/2019   • Essential hypertension, benign 12/3/2019   • History of CVA (cerebrovascular accident) 12/3/2019   • History of hypothyroidism 12/3/2019   • History of tobacco use 12/3/2019   • PAF (paroxysmal atrial fibrillation) (Formerly [facility]) 12/3/2019   • Status post placement of implantable loop recorder 12/3/2019       FAMILY HISTORY  Family History   Problem Relation Age of Onset   • Heart Disease Neg Hx        SOCIAL HISTORY  Social History     Socioeconomic History   • Marital status:      Spouse name: Not on file   • Number of children: Not on file   • Years of education: Not on file   • Highest education level: Not on file   Occupational History   • Not on file   Social Needs   • Financial resource strain: Not on file   • Food insecurity:     Worry: Not on file     Inability: Not on file   • Transportation needs:     Medical: Not on file     Non-medical: Not on file   Tobacco Use   • Smoking status: Former Smoker     Packs/day: 0.00     Years: 30.00     Pack years: 0.00     Types: Pipe     Last attempt to quit: 12/3/2009     Years since quitting: 10.1   • Smokeless tobacco: Never Used   Substance and Sexual  "Activity   • Alcohol use: Yes   • Drug use: Never   • Sexual activity: Not on file   Lifestyle   • Physical activity:     Days per week: Not on file     Minutes per session: Not on file   • Stress: Not on file   Relationships   • Social connections:     Talks on phone: Not on file     Gets together: Not on file     Attends Cheondoism service: Not on file     Active member of club or organization: Not on file     Attends meetings of clubs or organizations: Not on file     Relationship status: Not on file   • Intimate partner violence:     Fear of current or ex partner: Not on file     Emotionally abused: Not on file     Physically abused: Not on file     Forced sexual activity: Not on file   Other Topics Concern   • Not on file   Social History Narrative   • Not on file       SURGICAL HISTORY  No past surgical history on file.    CURRENT MEDICATIONS  Home Medications     Reviewed by Adán Trejo (Pharmacy Tech) on 01/31/20 at 1413  Med List Status: Complete   Medication Last Dose Status   albuterol (PROVENTIL) 2.5mg/0.5ml Nebu Soln UNK Active   albuterol 108 (90 Base) MCG/ACT Aero Soln inhalation aerosol UNK Active   aspirin EC (ECOTRIN) 81 MG Tablet Delayed Response UNK Active   atorvastatin (LIPITOR) 80 MG tablet UNK Active   clopidogrel (PLAVIX) 75 MG Tab UNK Active   fluticasone (FLOVENT HFA) 110 MCG/ACT Aerosol UNK Active   levothyroxine (SYNTHROID) 175 MCG Tab UNK Active   losartan (COZAAR) 25 MG Tab UNK Active   montelukast (SINGULAIR) 10 MG Tab UNK Active   omeprazole (PRILOSEC) 20 MG Tablet Delayed Response delayed-release tablet UNK Active   vitamin D, Ergocalciferol, (DRISDOL) 1.25 MG (73260 UT) Cap capsule UNK Active                ALLERGIES  Allergies   Allergen Reactions   • Codeine Vomiting       PHYSICAL EXAM  VITAL SIGNS: /85   Pulse 78   Temp 36.4 °C (97.5 °F) (Oral)   Resp 18   Ht 1.702 m (5' 7\")   Wt 88.5 kg (195 lb)   SpO2 94%   BMI 30.54 kg/m²    Constitutional: Awake alert " nontoxic.  Confused no acute distress  HENT: Normocephalic, Atraumatic, Bilateral external ears normal, Oropharynx moist, No oral exudates, Nose normal.   Eyes: PERRL, EOMI, Conjunctiva normal, No discharge.   Cardiovascular: Normal heart rate, Normal rhythm, No murmurs,   Thorax & Lungs: Normal breath sounds, No respiratory distress, No wheezing, No chest tenderness.   Abdomen: Bowel sounds normal, Soft, No tenderness,   Skin: Warm, Dry, No erythema, No rash.   Back: No tenderness, No CVA tenderness.   Musculoskeletal: Good range of motion in all major joints.   Neurologic: Alert, No focal deficits noted.  Oriented to location and year but not any other details.  Moves all extremities  Psychiatric: Affect unable to assess    Results for orders placed or performed during the hospital encounter of 01/31/20   CBC WITH DIFFERENTIAL   Result Value Ref Range    WBC 7.8 4.8 - 10.8 K/uL    RBC 5.79 4.70 - 6.10 M/uL    Hemoglobin 17.2 14.0 - 18.0 g/dL    Hematocrit 52.1 (H) 42.0 - 52.0 %    MCV 90.0 81.4 - 97.8 fL    MCH 29.7 27.0 - 33.0 pg    MCHC 33.0 (L) 33.7 - 35.3 g/dL    RDW 43.9 35.9 - 50.0 fL    Platelet Count 274 164 - 446 K/uL    MPV 9.0 9.0 - 12.9 fL    Neutrophils-Polys 66.50 44.00 - 72.00 %    Lymphocytes 16.10 (L) 22.00 - 41.00 %    Monocytes 11.60 0.00 - 13.40 %    Eosinophils 5.00 0.00 - 6.90 %    Basophils 0.40 0.00 - 1.80 %    Immature Granulocytes 0.40 0.00 - 0.90 %    Nucleated RBC 0.00 /100 WBC    Neutrophils (Absolute) 5.16 1.82 - 7.42 K/uL    Lymphs (Absolute) 1.25 1.00 - 4.80 K/uL    Monos (Absolute) 0.90 (H) 0.00 - 0.85 K/uL    Eos (Absolute) 0.39 0.00 - 0.51 K/uL    Baso (Absolute) 0.03 0.00 - 0.12 K/uL    Immature Granulocytes (abs) 0.03 0.00 - 0.11 K/uL    NRBC (Absolute) 0.00 K/uL   COMP METABOLIC PANEL   Result Value Ref Range    Sodium 144 135 - 145 mmol/L    Potassium 3.9 3.6 - 5.5 mmol/L    Chloride 108 96 - 112 mmol/L    Co2 27 20 - 33 mmol/L    Anion Gap 9.0 0.0 - 11.9    Glucose 96 65 -  99 mg/dL    Bun 17 8 - 22 mg/dL    Creatinine 0.96 0.50 - 1.40 mg/dL    Calcium 9.6 8.5 - 10.5 mg/dL    AST(SGOT) 23 12 - 45 U/L    ALT(SGPT) 25 2 - 50 U/L    Alkaline Phosphatase 85 30 - 99 U/L    Total Bilirubin 0.7 0.1 - 1.5 mg/dL    Albumin 4.0 3.2 - 4.9 g/dL    Total Protein 7.0 6.0 - 8.2 g/dL    Globulin 3.0 1.9 - 3.5 g/dL    A-G Ratio 1.3 g/dL   URINALYSIS   Result Value Ref Range    Color DK Yellow     Character Clear     Specific Gravity 1.029 <1.035    Ph 5.5 5.0 - 8.0    Glucose Negative Negative mg/dL    Ketones Negative Negative mg/dL    Protein 100 (A) Negative mg/dL    Bilirubin Negative Negative    Urobilinogen, Urine 1.0 Negative    Nitrite Negative Negative    Leukocyte Esterase Negative Negative    Occult Blood Negative Negative    Micro Urine Req Microscopic    LACTIC ACID   Result Value Ref Range    Lactic Acid 1.8 0.5 - 2.0 mmol/L   ESTIMATED GFR   Result Value Ref Range    GFR If African American >60 >60 mL/min/1.73 m 2    GFR If Non African American >60 >60 mL/min/1.73 m 2   URINE MICROSCOPIC (W/UA)   Result Value Ref Range    WBC 0-2 (A) /hpf    RBC 2-5 (A) /hpf    Bacteria Negative None /hpf    Epithelial Cells Negative /hpf    Hyaline Cast 0-2 /lpf   Hemoglobin A1C   Result Value Ref Range    Glycohemoglobin 5.5 0.0 - 5.6 %    Est Avg Glucose 111 mg/dL   Diagnostic alcohol (blood)   Result Value Ref Range    Diagnostic Alcohol 0.00 0.00 g/dL   LACTIC ACID   Result Value Ref Range    Lactic Acid 1.1 0.5 - 2.0 mmol/L        CT-HEAD W/O   Final Result      1.  Cerebral atrophy.      2.  White matter lucencies most consistent with small vessel ischemic change versus demyelination or gliosis.      3. No evidence of acute cerebral infarction, hemorrhage or mass lesion.      4. Prominent ventricles likely related to central atrophy versus normal pressure hydrocephalus.      5. CSF attenuation structure in the left middle cranial fossa likely representing arachnoid cyst versus prominence of the  subdural space related to underlying atrophy.      DX-CHEST-PORTABLE (1 VIEW)   Final Result      Hypoinflation without other evidence for acute cardiopulmonary disease.      MR-BRAIN-W/O    (Results Pending)       COURSE & MEDICAL DECISION MAKING  Pertinent Labs & Imaging studies reviewed. (See chart for details)        Patient presents with altered mentation and appears to be unable to care for himself.  The family is unable to care for him he has increasing weakness.  This been going on for several days.  Do not see any acute signs of stroke.  Head CT is nondiagnostic.    Stroke remains a differential diagnosis.  He would not be a candidate for clot retrieval or alteplase because the duration of several days.    Metabolic work-up is unrevealing.  There is no signs of infectious etiology.  Urinalysis is negative.    The patient otherwise looks stable on reassessment he does not have a safe disposition for home.  He will be hospitalized for altered mentation and possible placement.  I discussed case the hospitalist and care is transferred at that time      FINAL IMPRESSION  1. Weakness     2. Adult failure to thrive         2.   3.         Electronically signed by: Anibal Ruiz M.D., 1/31/2020 2:28 PM

## 2020-02-01 ENCOUNTER — APPOINTMENT (OUTPATIENT)
Dept: RADIOLOGY | Facility: MEDICAL CENTER | Age: 80
DRG: 071 | End: 2020-02-01
Attending: INTERNAL MEDICINE
Payer: MEDICARE

## 2020-02-01 LAB
AMMONIA PLAS-SCNC: 29 UMOL/L (ref 11–45)
ANION GAP SERPL CALC-SCNC: 9 MMOL/L (ref 0–11.9)
BASOPHILS # BLD AUTO: 0.7 % (ref 0–1.8)
BASOPHILS # BLD: 0.05 K/UL (ref 0–0.12)
BUN SERPL-MCNC: 18 MG/DL (ref 8–22)
CALCIUM SERPL-MCNC: 9 MG/DL (ref 8.5–10.5)
CHLORIDE SERPL-SCNC: 108 MMOL/L (ref 96–112)
CHOLEST SERPL-MCNC: 144 MG/DL (ref 100–199)
CO2 SERPL-SCNC: 25 MMOL/L (ref 20–33)
CREAT SERPL-MCNC: 0.99 MG/DL (ref 0.5–1.4)
EOSINOPHIL # BLD AUTO: 0.5 K/UL (ref 0–0.51)
EOSINOPHIL NFR BLD: 6.6 % (ref 0–6.9)
ERYTHROCYTE [DISTWIDTH] IN BLOOD BY AUTOMATED COUNT: 44.5 FL (ref 35.9–50)
GLUCOSE SERPL-MCNC: 91 MG/DL (ref 65–99)
HCT VFR BLD AUTO: 49.3 % (ref 42–52)
HDLC SERPL-MCNC: 44 MG/DL
HGB BLD-MCNC: 15.4 G/DL (ref 14–18)
IMM GRANULOCYTES # BLD AUTO: 0.03 K/UL (ref 0–0.11)
IMM GRANULOCYTES NFR BLD AUTO: 0.4 % (ref 0–0.9)
LDLC SERPL CALC-MCNC: 83 MG/DL
LYMPHOCYTES # BLD AUTO: 1.48 K/UL (ref 1–4.8)
LYMPHOCYTES NFR BLD: 19.7 % (ref 22–41)
MCH RBC QN AUTO: 28.5 PG (ref 27–33)
MCHC RBC AUTO-ENTMCNC: 31.2 G/DL (ref 33.7–35.3)
MCV RBC AUTO: 91.1 FL (ref 81.4–97.8)
MONOCYTES # BLD AUTO: 0.87 K/UL (ref 0–0.85)
MONOCYTES NFR BLD AUTO: 11.6 % (ref 0–13.4)
NEUTROPHILS # BLD AUTO: 4.59 K/UL (ref 1.82–7.42)
NEUTROPHILS NFR BLD: 61 % (ref 44–72)
NRBC # BLD AUTO: 0 K/UL
NRBC BLD-RTO: 0 /100 WBC
PLATELET # BLD AUTO: 243 K/UL (ref 164–446)
PMV BLD AUTO: 8.7 FL (ref 9–12.9)
POTASSIUM SERPL-SCNC: 4 MMOL/L (ref 3.6–5.5)
RBC # BLD AUTO: 5.41 M/UL (ref 4.7–6.1)
SODIUM SERPL-SCNC: 142 MMOL/L (ref 135–145)
TRIGL SERPL-MCNC: 84 MG/DL (ref 0–149)
TSH SERPL DL<=0.005 MIU/L-ACNC: 0.85 UIU/ML (ref 0.38–5.33)
VIT B12 SERPL-MCNC: 307 PG/ML (ref 211–911)
WBC # BLD AUTO: 7.5 K/UL (ref 4.8–10.8)

## 2020-02-01 PROCEDURE — 85025 COMPLETE CBC W/AUTO DIFF WBC: CPT

## 2020-02-01 PROCEDURE — 97162 PT EVAL MOD COMPLEX 30 MIN: CPT

## 2020-02-01 PROCEDURE — 770020 HCHG ROOM/CARE - TELE (206)

## 2020-02-01 PROCEDURE — 70551 MRI BRAIN STEM W/O DYE: CPT

## 2020-02-01 PROCEDURE — 36415 COLL VENOUS BLD VENIPUNCTURE: CPT

## 2020-02-01 PROCEDURE — 92612 ENDOSCOPY SWALLOW (FEES) VID: CPT

## 2020-02-01 PROCEDURE — 84443 ASSAY THYROID STIM HORMONE: CPT

## 2020-02-01 PROCEDURE — 99233 SBSQ HOSP IP/OBS HIGH 50: CPT | Performed by: INTERNAL MEDICINE

## 2020-02-01 PROCEDURE — 80048 BASIC METABOLIC PNL TOTAL CA: CPT

## 2020-02-01 PROCEDURE — 82140 ASSAY OF AMMONIA: CPT

## 2020-02-01 PROCEDURE — A9270 NON-COVERED ITEM OR SERVICE: HCPCS | Performed by: INTERNAL MEDICINE

## 2020-02-01 PROCEDURE — 82607 VITAMIN B-12: CPT

## 2020-02-01 PROCEDURE — G0378 HOSPITAL OBSERVATION PER HR: HCPCS

## 2020-02-01 PROCEDURE — 700102 HCHG RX REV CODE 250 W/ 637 OVERRIDE(OP): Performed by: INTERNAL MEDICINE

## 2020-02-01 PROCEDURE — 96372 THER/PROPH/DIAG INJ SC/IM: CPT

## 2020-02-01 PROCEDURE — 80061 LIPID PANEL: CPT

## 2020-02-01 PROCEDURE — 92610 EVALUATE SWALLOWING FUNCTION: CPT

## 2020-02-01 PROCEDURE — 97166 OT EVAL MOD COMPLEX 45 MIN: CPT

## 2020-02-01 PROCEDURE — 700111 HCHG RX REV CODE 636 W/ 250 OVERRIDE (IP): Performed by: INTERNAL MEDICINE

## 2020-02-01 RX ADMIN — FLUTICASONE PROPIONATE 220 MCG: 110 AEROSOL, METERED RESPIRATORY (INHALATION) at 09:57

## 2020-02-01 RX ADMIN — ENOXAPARIN SODIUM 40 MG: 100 INJECTION SUBCUTANEOUS at 05:05

## 2020-02-01 RX ADMIN — ASPIRIN 300 MG: 300 SUPPOSITORY RECTAL at 09:57

## 2020-02-01 ASSESSMENT — COGNITIVE AND FUNCTIONAL STATUS - GENERAL
MOVING TO AND FROM BED TO CHAIR: UNABLE
WALKING IN HOSPITAL ROOM: A LITTLE
DRESSING REGULAR UPPER BODY CLOTHING: A LOT
SUGGESTED CMS G CODE MODIFIER DAILY ACTIVITY: CK
CLIMB 3 TO 5 STEPS WITH RAILING: A LITTLE
MOBILITY SCORE: 15
EATING MEALS: A LITTLE
MOVING FROM LYING ON BACK TO SITTING ON SIDE OF FLAT BED: UNABLE
STANDING UP FROM CHAIR USING ARMS: A LITTLE
HELP NEEDED FOR BATHING: A LITTLE
DRESSING REGULAR LOWER BODY CLOTHING: A LOT
PERSONAL GROOMING: A LITTLE
TOILETING: A LITTLE
DAILY ACTIVITIY SCORE: 16
SUGGESTED CMS G CODE MODIFIER MOBILITY: CK

## 2020-02-01 ASSESSMENT — GAIT ASSESSMENTS
ASSISTIVE DEVICE: FRONT WHEEL WALKER
DEVIATION: BRADYKINETIC;STEP TO
GAIT LEVEL OF ASSIST: MINIMAL ASSIST
DISTANCE (FEET): 150

## 2020-02-01 ASSESSMENT — ACTIVITIES OF DAILY LIVING (ADL): TOILETING: UNABLE TO DETERMINE AT THIS TIME

## 2020-02-01 NOTE — THERAPY
"Occupational Therapy Evaluation completed.   Functional Status:  Cyndee supine>sit, SPV sit>stand, incontinence of urine upon standing, Cyndee functional mobility of household distances w/FWW, Cyndee toilet txf, Cyndee standing grooming, ModA LB dress (socks), ModA UB dress (gown), Cyndee sit>supine  Plan of Care: Will benefit from Occupational Therapy 3 times per week  Discharge Recommendations:  Equipment: Will Continue to Assess for Equipment Needs. Post-acute therapy Recommend post-acute placement for additional occupational therapy services prior to discharge home.    See \"Rehab Therapy-Acute\" Patient Summary Report for complete documentation.    Pt is pleasant and cooperative 78yo male admitted with family report of increased weakness, pmhx includes CVA. Per chart, pt lives with his wife in the independent side of [facility], however family has concerns for their safety continuing to live independently. Pt presents to OT eval with flat affect, direct and clear responses to questions with \"Yes\" or \"No\" statements, no elaboration or conversation. Unclear cognitive baseline or degree of reliability in regards to his history however when asked about history of falls at home pt did report \"None that were my fault.\" and when questioned if it were a pet or environmental hazard he responded \"my wife.\" During OT eval, pt had episode of urinary incontinence immediately upon standing at EOB, ambulated to BR, completed toileting with Cyndee, and washed hands at sink with Cyndee. Home to independent [facility] with questionable spouse/caregiver capacity would be unsafe DC at this time. Recommend post-acute placement for additional inpatient OT. Per chart pt family is working on assisted living placement with increased paid caregiver support.   "

## 2020-02-01 NOTE — THERAPY
"Speech Language Therapy FEES completed.    Functional Status: Patient was seen on this date for a FEES evaluation. Scope was inserted through left nare, patient turning head during session, but overall tolerated procedure without difficulty. Throat clearing x3 prior to PO trials but no nayla secretions in airway. Upon insertion of scope, epiglottis appeared irregular in shape (deviating to right) with edema of arytenoids bilaterally (R>L) which impeded complete visualization of vocal cords during adduction. However, glottic chink during phonation was visualized with a narrow view. Small, bright blood-colored spot on anterior 1/3 R true vocal cord (?query vocal hemorrhage due to persistent throat clearing). PO trials were administered and consisted of single ice chips, NTL, puree, pudding, thin liquids, and soft solids.     Patient is presenting with moderate oropharyngeal dysphagia and characterized by: reduced laryngeal elevation, reduced epiglottic inversion, and reduced bolus control resulting in spillage over top of epiglottis with NTL and spillage to pyriform sinuses with ice chips, thin liquids, and soft solids. No penetration or aspiration with puree or pudding, mild vallecular reside only. ASPIRATION of thin liquids suspected during the swallow as seen by moderate amount of blue residue on anterior aspect of trachea, superior to first tracheal ring. Pt unable to follow directives for cued cough or timely second swallows in attempts to clear aspirates. Deep penetration suspected during the swallow with trials of NTL as seen by blue in laryngeal vestibule and trace amounts of residue on true vocal cords at the anterior commissure. Patient with inconsistent mild throat clearing during session and immediate harsh throat clear following aspiration episode.     Education provided to pt utilizing images from the study. When asked if patient would be agreeable to cortrak placement he stated, \"no.\" However, difficult " "to determine if patient truly understands risk of aspiration with PO diet given AMS. RN updated.    Recommendations: Given acute encephalopathy and current dysphagia, patient remains at very high risk for aspiration and would recommend continue NPO with consideration for cortrak placement. If patient/family wishes are to decline a feeding tube, would recommend a D1/NTL diet as the safest alternative to minimize not completely eliminate the risk of aspiration. OK for a few ice chips an hour to minimize xerostomia.                             Medication Administration: Via Gastric Tube, OK for crushed meds in puree until POC is determine     Plan of Care: Will benefit from Speech Therapy 3 times per week    Post-Acute Therapy: Recommend inpatient transitional care services for continued speech therapy services.      See \"Rehab Therapy-Acute\" Patient Summary Report for complete documentation. Thank you for the consult.   "

## 2020-02-01 NOTE — PROGRESS NOTES
[facility] Medicine Daily Progress Note    Date of Service  2/1/2020    Chief Complaint  79 y.o. male admitted 1/31/2020 with weakness     Hospital Course    This is a 78 y/o M with PMHX of CVA, who was brought in to ER by his daughter for weakness and confusion.  Pt has been admitted for work up to r/o any new CVA.        Interval Problem Update  Pt seen and examined, resting in bed, no acute events overnight, MRI pending. Afebrile.     Consultants/Specialty  None     Code Status  Full Code     Disposition  TBD     Review of Systems  Review of Systems   Unable to perform ROS: Mental acuity        Physical Exam  Temp:  [36.1 °C (97 °F)-36.9 °C (98.4 °F)] 36.1 °C (97 °F)  Pulse:  [70-88] 70  Resp:  [12-23] 22  BP: (116-153)/(76-98) 150/77  SpO2:  [90 %-97 %] 92 %    Physical Exam  Vitals signs and nursing note reviewed.   Constitutional:       General: He is not in acute distress.     Appearance: Normal appearance.   HENT:      Head: Normocephalic and atraumatic.      Nose: Nose normal.      Mouth/Throat:      Mouth: Mucous membranes are moist.   Eyes:      Extraocular Movements: Extraocular movements intact.      Pupils: Pupils are equal, round, and reactive to light.   Neck:      Musculoskeletal: Normal range of motion and neck supple.   Cardiovascular:      Rate and Rhythm: Normal rate and regular rhythm.   Pulmonary:      Effort: Pulmonary effort is normal.      Breath sounds: Normal breath sounds.   Abdominal:      General: Abdomen is flat. There is no distension.      Tenderness: There is no tenderness. There is no guarding or rebound.   Musculoskeletal: Normal range of motion.         General: No swelling or deformity.   Skin:     General: Skin is warm and dry.   Neurological:      General: No focal deficit present.      Mental Status: He is alert.      Comments: Mild dysarthria  Speak in 1-2 words   Psychiatric:         Mood and Affect: Mood normal. Affect is blunt and flat.         Behavior: Behavior is  slowed.         Cognition and Memory: Cognition is impaired.         Fluids    Intake/Output Summary (Last 24 hours) at 2/1/2020 1244  Last data filed at 2/1/2020 0500  Gross per 24 hour   Intake --   Output 200 ml   Net -200 ml       Laboratory  Recent Labs     01/31/20  1352 02/01/20  0511   WBC 7.8 7.5   RBC 5.79 5.41   HEMOGLOBIN 17.2 15.4   HEMATOCRIT 52.1* 49.3   MCV 90.0 91.1   MCH 29.7 28.5   MCHC 33.0* 31.2*   RDW 43.9 44.5   PLATELETCT 274 243   MPV 9.0 8.7*     Recent Labs     01/31/20  1352 02/01/20  0511   SODIUM 144 142   POTASSIUM 3.9 4.0   CHLORIDE 108 108   CO2 27 25   GLUCOSE 96 91   BUN 17 18   CREATININE 0.96 0.99   CALCIUM 9.6 9.0             Recent Labs     02/01/20  0511   TRIGLYCERIDE 84   HDL 44   LDL 83       Imaging  CT-HEAD W/O   Final Result      1.  Cerebral atrophy.      2.  White matter lucencies most consistent with small vessel ischemic change versus demyelination or gliosis.      3. No evidence of acute cerebral infarction, hemorrhage or mass lesion.      4. Prominent ventricles likely related to central atrophy versus normal pressure hydrocephalus.      5. CSF attenuation structure in the left middle cranial fossa likely representing arachnoid cyst versus prominence of the subdural space related to underlying atrophy.      DX-CHEST-PORTABLE (1 VIEW)   Final Result      Hypoinflation without other evidence for acute cardiopulmonary disease.      MR-BRAIN-W/O    (Results Pending)        Assessment/Plan  Encephalopathy  Assessment & Plan  Stroke on differential.  CT head without contrast negative for acute findings  MRI brain pending   Neurochecks every 4 hours  Fall precautions    PAF (paroxysmal atrial fibrillation) (HCC)- (present on admission)  Assessment & Plan  Heart rate is under control  Not on any rate controlling medication  We will consider starting Eliquis for stroke prevention by discharge       History of CVA (cerebrovascular accident)- (present on  admission)  Assessment & Plan  Thought to be embolic in nature.  During previous hospitalization in December 2019 patient was going to be started on Eliquis due to history of A. Fib  We will continue aspirin and Plavix.  We will consider starting Eliquis    History of hypothyroidism- (present on admission)  Assessment & Plan  We will recheck TSH  Continue levothyroxine 175 mcg a day         VTE prophylaxis: scd

## 2020-02-01 NOTE — CARE PLAN
Problem: Safety  Goal: Will remain free from injury  Outcome: PROGRESSING AS EXPECTED  Note:   Fall/aspiration precautions in place     Problem: Knowledge Deficit  Goal: Knowledge of disease process/condition, treatment plan, diagnostic tests, and medications will improve  Outcome: PROGRESSING AS EXPECTED  Note:   Pt updated on POC     Problem: Communication  Goal: The ability to communicate needs accurately and effectively will improve  Outcome: PROGRESSING SLOWER THAN EXPECTED  Note:   Pt has delayed response

## 2020-02-01 NOTE — PROGRESS NOTES
Monitor summary: SR 71-77, CT 0.22, QRS 0.08, QT 0.40 with 1st degree block, rare PVC-couplet, bigeminy,trigeminy, rare PAC per strip from monitor room.

## 2020-02-01 NOTE — H&P
"Hospital Medicine History & Physical Note    Date of Service  2020    Primary Care Physician  Dell Moore N.P.    Consultants  None    Code Status  Full code    Chief Complaint  Weakness    History of Presenting Illness  79 y.o. male with prior history of stroke who presented 2020 with concerns of weakness and confusion  History is limited as patient is alone in the room, no family at bedside.  There is a question why he is in the hospital, he responds \" mini stroke\".  Patient would not elaborate.  There was no obvious focal deficit on exam he is alert and oriented in time and place.  Patient is very poor historian however.  According ERP, patient's daughter concerned about generalized weakness and confusion in the last 3 days.    Review of Systems  Review of Systems   Unable to perform ROS: Dementia       Past Medical History   has a past medical history of Cough (12/3/2019), Dyslipidemia (12/3/2019), Essential hypertension, benign (12/3/2019), History of CVA (cerebrovascular accident) (12/3/2019), History of hypothyroidism (12/3/2019), History of tobacco use (12/3/2019), PAF (paroxysmal atrial fibrillation) ([facility]) (12/3/2019), and Status post placement of implantable loop recorder (12/3/2019).    Surgical History   has no past surgical history on file.     Family History  Patient denies family history of heart disease    Social History  Patient denies smoking cigarettes reports that he quit smoking about 10 years ago. His smoking use included pipe. He smoked 0.00 packs per day for 30.00 years. He has never used smokeless tobacco. He reports current alcohol use. He reports that he does not use drugs.    Allergies  Allergies   Allergen Reactions   • Codeine Vomiting       Medications  Prior to Admission Medications   Prescriptions Last Dose Informant Patient Reported? Taking?   albuterol (PROVENTIL) 2.5mg/0.5ml Nebu Soln UNK at UNK Family Member Yes Yes   Si.5 mg by Nebulization route every four " hours as needed for Shortness of Breath.   albuterol 108 (90 Base) MCG/ACT Aero Soln inhalation aerosol UNK at UNK Family Member Yes No   Sig: Inhale 2 Puffs by mouth every 6 hours as needed for Shortness of Breath.   aspirin EC (ECOTRIN) 81 MG Tablet Delayed Response UNK at UNK Family Member Yes Yes   Sig: Take 81 mg by mouth every day.   atorvastatin (LIPITOR) 80 MG tablet UNK at UNK Family Member Yes No   Sig: Take 80 mg by mouth every evening.   clopidogrel (PLAVIX) 75 MG Tab UNK at UNK Family Member Yes No   Sig: Take 75 mg by mouth every day.   fluticasone (FLOVENT HFA) 110 MCG/ACT Aerosol UNK at K Family Member Yes No   Sig: Inhale 2 Puffs by mouth 2 times a day.   levothyroxine (SYNTHROID) 175 MCG Tab UNK at UNK Family Member Yes No   Sig: Take 175 mcg by mouth Every morning on an empty stomach.   losartan (COZAAR) 25 MG Tab UNK at K Family Member No No   Sig: Take 1 Tab by mouth every day. STOP IF COUGH DEVELOPS   montelukast (SINGULAIR) 10 MG Tab UNK at UNK Family Member Yes Yes   Sig: Take 10 mg by mouth every day.   omeprazole (PRILOSEC) 20 MG Tablet Delayed Response delayed-release tablet UNK at UNK Family Member Yes No   Sig: Take 20 mg by mouth every day.   vitamin D, Ergocalciferol, (DRISDOL) 1.25 MG (00689 UT) Cap capsule UNK at UNK Family Member Yes Yes   Sig: Take 50,000 Units by mouth every 7 days.      Facility-Administered Medications: None       Physical Exam  Temp:  [36.4 °C (97.5 °F)] 36.4 °C (97.5 °F)  Pulse:  [75-81] 79  Resp:  [15-20] 20  BP: (116-143)/(76-88) 130/78  SpO2:  [93 %-97 %] 93 %    Physical Exam  Vitals signs and nursing note reviewed.   Constitutional:       General: He is not in acute distress.     Appearance: Normal appearance.   HENT:      Head: Normocephalic and atraumatic.      Nose: Nose normal.      Mouth/Throat:      Mouth: Mucous membranes are moist.   Eyes:      Extraocular Movements: Extraocular movements intact.      Pupils: Pupils are equal, round, and  reactive to light.   Neck:      Musculoskeletal: Normal range of motion and neck supple.   Cardiovascular:      Rate and Rhythm: Normal rate and regular rhythm.   Pulmonary:      Effort: Pulmonary effort is normal.      Breath sounds: Normal breath sounds.   Abdominal:      General: Abdomen is flat. There is no distension.      Tenderness: There is no tenderness.   Musculoskeletal: Normal range of motion.         General: No swelling or deformity.   Skin:     General: Skin is warm and dry.   Neurological:      General: No focal deficit present.      Mental Status: He is alert.      Comments: Mild dysarthria  Speak in 1-2 words   Psychiatric:         Mood and Affect: Mood normal. Affect is blunt and flat.         Behavior: Behavior is slowed.         Cognition and Memory: Cognition is impaired.         Laboratory:  Recent Labs     01/31/20  1352   WBC 7.8   RBC 5.79   HEMOGLOBIN 17.2   HEMATOCRIT 52.1*   MCV 90.0   MCH 29.7   MCHC 33.0*   RDW 43.9   PLATELETCT 274   MPV 9.0     Recent Labs     01/31/20  1352   SODIUM 144   POTASSIUM 3.9   CHLORIDE 108   CO2 27   GLUCOSE 96   BUN 17   CREATININE 0.96   CALCIUM 9.6     Recent Labs     01/31/20  1352   ALTSGPT 25   ASTSGOT 23   ALKPHOSPHAT 85   TBILIRUBIN 0.7   GLUCOSE 96         No results for input(s): NTPROBNP in the last 72 hours.      No results for input(s): TROPONINT in the last 72 hours.    Urinalysis:    Recent Labs     01/31/20  1643   SPECGRAVITY 1.029   GLUCOSEUR Negative   KETONES Negative   NITRITE Negative   LEUKESTERAS Negative   WBCURINE 0-2*   RBCURINE 2-5*   BACTERIA Negative   EPITHELCELL Negative        Imaging:  CT-HEAD W/O   Final Result      1.  Cerebral atrophy.      2.  White matter lucencies most consistent with small vessel ischemic change versus demyelination or gliosis.      3. No evidence of acute cerebral infarction, hemorrhage or mass lesion.      4. Prominent ventricles likely related to central atrophy versus normal pressure  hydrocephalus.      5. CSF attenuation structure in the left middle cranial fossa likely representing arachnoid cyst versus prominence of the subdural space related to underlying atrophy.      DX-CHEST-PORTABLE (1 VIEW)   Final Result      Hypoinflation without other evidence for acute cardiopulmonary disease.      MR-BRAIN-W/O    (Results Pending)         Assessment/Plan:  I anticipate this patient is appropriate for observation status at this time.    Encephalopathy  Assessment & Plan  Stroke on differential.  CT head without contrast negative for acute findings  Ordered MRI of the brain without contrast; but patient has pacemaker with could be incompatible with  Ordered ammonia, B12 level, TSH, bladder scan  Neurochecks every 4 hours  Fall precautions    PAF (paroxysmal atrial fibrillation) (HCC)- (present on admission)  Assessment & Plan  Heart rate is under control  Not on any rate controlling medication  We will consider starting Eliquis for stroke prevention by discharge    History of CVA (cerebrovascular accident)- (present on admission)  Assessment & Plan  Thought to be embolic in nature.  During previous hospitalization in December 2019 patient was going to be started on Eliquis due to history of A. Fib  We will continue aspirin and Plavix.  We will consider starting Eliquis    History of hypothyroidism- (present on admission)  Assessment & Plan  We will recheck TSH  Continue levothyroxine 175 mcg a day      VTE prophylaxis: Heparin

## 2020-02-01 NOTE — PROGRESS NOTES
This RN spoke to wife who had the information card for the loop recorder. Wife was able to read off the serial number for me and gave me     [id number] -   Biotronic Loop Recorder       Per wife implanted approximately 1 year ago. Per patient implanted 5 years ago. Wife is searching through old calenders to find the date it was actually implanted and states she will call me with the date. Wife couldn't give me an approximate date or year - both seem to be poor historians.
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
## 2020-02-01 NOTE — PROGRESS NOTES
Pt care assumed approximately 2100  Bedside report from ED RN  Pt in bed A&Ox4, with delayed speech  Fall aspiration precautions in place  Call light and belongings with in reach  Bed locked and in lowest position  Pt expresses no needs/concerns at this time  Hourly rounding provided

## 2020-02-01 NOTE — THERAPY
"Speech Language Therapy Clinical Swallow Evaluation completed.    Functional Status: Patient was seen on this date for a clinical swallow evaluation. Patient AAOx3 with confusion regarding reason for admit. Responses to verbal stimuli were significantly delayed but appropriate given additional processing time. He was a poor historian regarding hx of dysphagia and throat clearing. Vocal quality was mildly hoarse. Oral motor examination revealed trace L facial asymmetry, significantly reduced lingual ROM, and reduced labial ROM. Patient had significant throat clearing prior to PO intake, suction set up by RN. PO trials were administered and consisted of single ice chips, NTL, purees, and thin liquids. Swallow trigger mod-sev delayed (ranging from 10-20) seconds despite MAX verbal cueing for \"quick\" swallow. Patient had inconsistent throat clearing following ice chips, NTL, and puree. Episodes of immediate throat clearing increased following trials of thin liquids. Increase in wheezing occurred x5 during session. Education provided to patient regarding current status and SLP recs.     Recommendations - Diet: At this time, patient is presenting with inconsistent s/sx of aspiration and would recommend continue NPO with a FEES to further assess oropharyngeal function and determine safest means for nutrition.                             Strategies: To be determined                             Medication Administration: Non-oral source    Plan of Care: Will benefit from Speech Therapy 3 times per week    Post-Acute Therapy: Recommend inpatient transitional care services for continued speech therapy services.      See \"Rehab Therapy-Acute\" Patient Summary Report for complete documentation. Thank you for the consult.           "

## 2020-02-01 NOTE — PROGRESS NOTES
2 RN skin check complete    Pt has abrasion to left knee  Bruising/redness/blanching to sacrum  With possible small tail exposed

## 2020-02-01 NOTE — CARE PLAN
Patient free of falls and fall precautions in place. Patient verbalize understanding of illness process and POC discussed.        Problem: Safety  Goal: Will remain free from injury  Outcome: PROGRESSING AS EXPECTED     Problem: Knowledge Deficit  Goal: Knowledge of disease process/condition, treatment plan, diagnostic tests, and medications will improve  Outcome: PROGRESSING AS EXPECTED

## 2020-02-02 ENCOUNTER — APPOINTMENT (OUTPATIENT)
Dept: RADIOLOGY | Facility: MEDICAL CENTER | Age: 80
DRG: 071 | End: 2020-02-02
Attending: INTERNAL MEDICINE
Payer: MEDICARE

## 2020-02-02 PROBLEM — R13.10 DYSPHAGIA: Status: ACTIVE | Noted: 2020-02-02

## 2020-02-02 LAB
BACTERIA UR CULT: NORMAL
SIGNIFICANT IND 70042: NORMAL
SITE SITE: NORMAL
SOURCE SOURCE: NORMAL

## 2020-02-02 PROCEDURE — 700102 HCHG RX REV CODE 250 W/ 637 OVERRIDE(OP): Performed by: INTERNAL MEDICINE

## 2020-02-02 PROCEDURE — 770020 HCHG ROOM/CARE - TELE (206)

## 2020-02-02 PROCEDURE — A9270 NON-COVERED ITEM OR SERVICE: HCPCS | Performed by: INTERNAL MEDICINE

## 2020-02-02 PROCEDURE — 99232 SBSQ HOSP IP/OBS MODERATE 35: CPT | Performed by: INTERNAL MEDICINE

## 2020-02-02 PROCEDURE — 302136 NUTRITION PUMP: Performed by: INTERNAL MEDICINE

## 2020-02-02 RX ORDER — MONTELUKAST SODIUM 10 MG/1
10 TABLET ORAL DAILY
Status: DISCONTINUED | OUTPATIENT
Start: 2020-02-03 | End: 2020-02-05

## 2020-02-02 RX ORDER — ATORVASTATIN CALCIUM 80 MG/1
80 TABLET, FILM COATED ORAL NIGHTLY
Status: DISCONTINUED | OUTPATIENT
Start: 2020-02-02 | End: 2020-02-04

## 2020-02-02 RX ORDER — CLOPIDOGREL BISULFATE 75 MG/1
75 TABLET ORAL DAILY
Status: DISCONTINUED | OUTPATIENT
Start: 2020-02-03 | End: 2020-02-05

## 2020-02-02 RX ORDER — ASPIRIN 81 MG/1
81 TABLET, CHEWABLE ORAL DAILY
Status: DISCONTINUED | OUTPATIENT
Start: 2020-02-02 | End: 2020-02-04

## 2020-02-02 RX ORDER — LOSARTAN POTASSIUM 50 MG/1
25 TABLET ORAL DAILY
Status: DISCONTINUED | OUTPATIENT
Start: 2020-02-03 | End: 2020-02-05

## 2020-02-02 RX ORDER — BISACODYL 10 MG
10 SUPPOSITORY, RECTAL RECTAL
Status: DISCONTINUED | OUTPATIENT
Start: 2020-02-02 | End: 2020-02-05

## 2020-02-02 RX ORDER — ONDANSETRON 4 MG/1
4 TABLET, ORALLY DISINTEGRATING ORAL EVERY 4 HOURS PRN
Status: DISCONTINUED | OUTPATIENT
Start: 2020-02-02 | End: 2020-02-04

## 2020-02-02 RX ORDER — AMOXICILLIN 250 MG
2 CAPSULE ORAL 2 TIMES DAILY
Status: DISCONTINUED | OUTPATIENT
Start: 2020-02-02 | End: 2020-02-05

## 2020-02-02 RX ORDER — POLYETHYLENE GLYCOL 3350 17 G/17G
1 POWDER, FOR SOLUTION ORAL
Status: DISCONTINUED | OUTPATIENT
Start: 2020-02-02 | End: 2020-02-05

## 2020-02-02 RX ADMIN — ATORVASTATIN CALCIUM 80 MG: 80 TABLET, FILM COATED ORAL at 20:51

## 2020-02-02 RX ADMIN — OMEPRAZOLE 40 MG: KIT at 18:05

## 2020-02-02 RX ADMIN — ALBUTEROL SULFATE 2 PUFF: 90 AEROSOL, METERED RESPIRATORY (INHALATION) at 22:36

## 2020-02-02 RX ADMIN — FLUTICASONE PROPIONATE 220 MCG: 110 AEROSOL, METERED RESPIRATORY (INHALATION) at 10:00

## 2020-02-02 RX ADMIN — ASPIRIN 81 MG 81 MG: 81 TABLET ORAL at 18:05

## 2020-02-02 RX ADMIN — FLUTICASONE PROPIONATE 220 MCG: 110 AEROSOL, METERED RESPIRATORY (INHALATION) at 20:50

## 2020-02-02 NOTE — PROGRESS NOTES
Patient still refusing cortrack placement. Patient in god spirits but states he does not want a tube.

## 2020-02-02 NOTE — PROGRESS NOTES
"Daughter/ POA Merline calling for updates. Merline inquiring about patient's NPO status; Merline informed that patient is remaining NPO per speech recommendation. Merline expressing concern regarding patient's nutrition. Charge RN informed. Patient's mentation assessed; when asked why patient was brought to the hospital, patient stating \"because my wife insisted.\" Patient asked if he recalled why his wife insisted however unable to recall event. Patient asked if he understood risks of swallowing food/ fluids. Patient stating that he coughs, unable to describe risk for potential infection. Daughter Merline called back, informed that patient not completely understanding risks, situation and resulting continuation of NPO status. Daughter informed of missing POA paperwork, diet, and MRI results to be addressed with day team. Daughter expressing understanding. Questions and concerns addressed.   "

## 2020-02-02 NOTE — PROGRESS NOTES
Hospital Medicine Daily Progress Note    Date of Service  2/2/2020    Chief Complaint  79 y.o. male admitted 1/31/2020 with weakness     Hospital Course    This is a 80 y/o M with PMHX of CVA, who was brought in to ER by his daughter for weakness and confusion.  Pt has been admitted for work up to r/o any new CVA.        Interval Problem Update  Pt seen and examined, resting in bed, MRI neg for CVA, shows cerebral atrophy and arachnoid cyst. Pt has failed swallow eval and speech has recommended for tube feed.  Pt also with dementia   Discussed with daughter today who is the POA and also with his wife, pt initially said no to tube feed but now states is ok.   Palliative team also consulted.  Will start tube feed today, and family will get together to make final decision on how to proceed later.     Consultants/Specialty  Palliative team      Code Status  Full Code     Disposition  TBD     Review of Systems  Review of Systems   Unable to perform ROS: Mental acuity        Physical Exam  Temp:  [36 °C (96.8 °F)-37 °C (98.6 °F)] 37 °C (98.6 °F)  Pulse:  [70-78] 71  Resp:  [18-22] 20  BP: (124-157)/(61-95) 148/68  SpO2:  [90 %-95 %] 91 %    Physical Exam  Vitals signs and nursing note reviewed.   Constitutional:       General: He is not in acute distress.     Appearance: Normal appearance.   HENT:      Head: Normocephalic and atraumatic.      Nose: Nose normal.      Mouth/Throat:      Mouth: Mucous membranes are moist.   Eyes:      Extraocular Movements: Extraocular movements intact.      Pupils: Pupils are equal, round, and reactive to light.   Neck:      Musculoskeletal: Normal range of motion and neck supple.   Cardiovascular:      Rate and Rhythm: Normal rate and regular rhythm.   Pulmonary:      Effort: Pulmonary effort is normal.      Breath sounds: Normal breath sounds.   Abdominal:      General: Abdomen is flat. There is no distension.      Tenderness: There is no tenderness. There is no guarding or rebound.    Musculoskeletal: Normal range of motion.         General: No swelling or deformity.   Skin:     General: Skin is warm and dry.   Neurological:      General: No focal deficit present.      Mental Status: He is alert.      Comments: Mild dysarthria  Speak in 1-2 words   Psychiatric:         Mood and Affect: Mood normal. Affect is blunt and flat.         Behavior: Behavior is slowed.         Cognition and Memory: Cognition is impaired.         Fluids  No intake or output data in the 24 hours ending 02/02/20 1228    Laboratory  Recent Labs     01/31/20  1352 02/01/20  0511   WBC 7.8 7.5   RBC 5.79 5.41   HEMOGLOBIN 17.2 15.4   HEMATOCRIT 52.1* 49.3   MCV 90.0 91.1   MCH 29.7 28.5   MCHC 33.0* 31.2*   RDW 43.9 44.5   PLATELETCT 274 243   MPV 9.0 8.7*     Recent Labs     01/31/20  1352 02/01/20  0511   SODIUM 144 142   POTASSIUM 3.9 4.0   CHLORIDE 108 108   CO2 27 25   GLUCOSE 96 91   BUN 17 18   CREATININE 0.96 0.99   CALCIUM 9.6 9.0             Recent Labs     02/01/20  0511   TRIGLYCERIDE 84   HDL 44   LDL 83       Imaging  MR-BRAIN-W/O   Final Result         1. Age-related cerebral atrophy. Marked dilatation of the lateral ventricles and third ventricle and the possibility of normal pressure hydrocephalus should be considered.      2. Mild periventricular white matter changes consistent with chronic microvascular ischemic gliosis.      3. Moderate-sized arachnoid cyst arising in the anterior medial aspect left middle cranial fossa and extending into the suprasellar cistern.         4. Small crescentic area of chronic lacunar type infarction involving the left caudate nucleus      CT-HEAD W/O   Final Result      1.  Cerebral atrophy.      2.  White matter lucencies most consistent with small vessel ischemic change versus demyelination or gliosis.      3. No evidence of acute cerebral infarction, hemorrhage or mass lesion.      4. Prominent ventricles likely related to central atrophy versus normal pressure  hydrocephalus.      5. CSF attenuation structure in the left middle cranial fossa likely representing arachnoid cyst versus prominence of the subdural space related to underlying atrophy.      DX-CHEST-PORTABLE (1 VIEW)   Final Result      Hypoinflation without other evidence for acute cardiopulmonary disease.           Assessment/Plan  Dysphagia  Assessment & Plan  Has failed swallow eval initially had refused the tube feed now his wife at bedside and pt states ok with tube feed  Also discussed with his daughter today who is the POA and updated on plan of care.  Will start on tube feed today and daughter is flying in tomorrow and family will get together and make a final decision on how to go from here  Palliative team has also been consulted   Pt has dementia        Encephalopathy  Assessment & Plan  CT head without contrast negative for acute findings  MRI brain showing brain atrophy, enlarged ventricles, arachnoid cyst, old CVA but no acute cva  ? NPH will need to follow up with neurology as outpt, I have discussed with neurology and they will arrange for follow up outpt.  Fall precautions  Pt has dementia   Palliative team also consulted     PAF (paroxysmal atrial fibrillation) (HCC)- (present on admission)  Assessment & Plan  Heart rate is under control  Not on any rate controlling medication  We will consider starting Eliquis for stroke prevention by discharge       History of CVA (cerebrovascular accident)- (present on admission)  Assessment & Plan  Thought to be embolic in nature.  During previous hospitalization in December 2019 patient was going to be started on Eliquis due to history of A. Fib  We will continue aspirin and Plavix.  We will consider starting Eliquis    History of hypothyroidism- (present on admission)  Assessment & Plan  We will recheck TSH  Continue levothyroxine 175 mcg a day       VTE prophylaxis: scd

## 2020-02-02 NOTE — PROGRESS NOTES
Monitor summary: SR 66-77, CT 0.20, QRS 0.08, QT 0.48, with rare PVCs and rare couplets per strip from monitor room.

## 2020-02-02 NOTE — DIETARY
"Nutrition Support/TF Assessment:  Day 1 of admit.  Segundo Falk is a 79 y.o. male with admitting DX of Stroke-like symptom.      Current problem list:  1. Dysphagia  2. Encephalopathy  3. Hypothyroidism  4. CVA  5. Paroxysmal atrial fibrillation     Assessment:  Estimated Nutritional Needs based on:   Height: 170.2 cm (5' 7.01\")(copied from last entry)  Weight: 89.4 kg (197 lb 1.5 oz)  Weight to Use in Calculations: 88.5 kg (195 lb 1.7 oz)-per bedscale on  (first measured wt)  Ideal Body Weight: 67.1 kg (148 lb)  Percent Ideal Body Weight: 133.2  Body mass index is 30.86 kg/m²., BMI classification: Class I obesity    Calculation/Equation: MSJ X 1.0-1.2= 3152-9279  Total Calories / day: 9833-8313(Calories / k-21)  Total Grams Protein / day: 101-134  (Grams Protein / k.5-2.0 of IBW)  Total Free water/day: 5179-3158 ml/day (~25-30 ml/kg)     Evaluation:   1. TF consult received, SLP recommending NPO with cortrak placement.   2. Enteral access per KUB /: Feeding tube tip overlies the gastric body.  3. Labs/Meds reviewed.   4. Per MD notes: pt initially said no to TF but now states it is okay; Palliative team also consulted, plan is to start tube feed today, and family will get together to make final decision on how to proceed later.   5. Pt with delayed response but able to answer most questions, reports normal PO intake PTA and unsure of wt loss, states UBW is 180#.      Malnutrition Risk: does not meet criteria.      Recommendations/Plan:  1. Begin TF with Repete with Fiber @ 25 ml/hr and advance per TF protocol to goal rate of 70 ml/hr. This will provide 1680 kcals/day, 108 g pro/day, and 1394 ml free water/day.  2. Fluids per MD.  3. RD following.             "

## 2020-02-02 NOTE — PROGRESS NOTES
Monitor summary: SR 67-80, MT .20, QRS .12, QT .42, with frequent PVCs and trig. per strip from monitor room.

## 2020-02-02 NOTE — CARE PLAN
Problem: Knowledge Deficit  Goal: Knowledge of disease process/condition, treatment plan, diagnostic tests, and medications will improve  Outcome: PROGRESSING SLOWER THAN EXPECTED  Note:   Patient asking for food; NPO status and risk for aspiration and infection discussed. Patient requiring reorientation. Will continue to monitor.      Problem: Urinary Elimination:  Goal: Ability to reestablish a normal urinary elimination pattern will improve  Outcome: PROGRESSING SLOWER THAN EXPECTED  Note:   Patient up to bathroom to void however having episodes of incontinence.

## 2020-02-02 NOTE — THERAPY
"Physical Therapy Evaluation completed.   Bed Mobility:  Supine to Sit: Minimal Assist  Transfers: Sit to Stand: Supervised  Gait: Level Of Assist: Minimal Assist with Front-Wheel Walker       Plan of Care: Will benefit from Physical Therapy 3 times per week  Discharge Recommendations: Equipment: Will Continue to Assess for Equipment Needs.     Pt presents with impaired dynamic balance and gait associated with c/c of weakness and confusion, sent by family from [facility]. Pt is most limited by assumed residual CVA deficits of communication, at times able to speak in 3-4 word sentences; does best with yes and no questions; no focal deficits in LEs; able to initial ambulation with bradykinetic gait and left LE circumduction; difficult to know what pt's baseline is at this time, may be close however he is requiring physical assist for OOB activities, if this is not available at his [facility] then recommend placement; if he was receiving one person assist prior (and confirmed safe social situation) then can return to [facility] with home health PT. Will follow.        See \"Rehab Therapy-Acute\" Patient Summary Report for complete documentation.     "

## 2020-02-03 LAB
CK BB CFR SERPL ELPH: 0 % (ref 0–0)
CK MACRO1 CFR SERPL: 0 % (ref 0–0)
CK MACRO2 CFR SERPL: 0 % (ref 0–0)
CK MB CFR SERPL ELPH: 0 % (ref 0–4)
CK MM CFR SERPL ELPH: 100 % (ref 96–100)
CK SERPL-CCNC: 587 U/L (ref 20–200)
CRP SERPL HS-MCNC: 2.54 MG/DL (ref 0–0.75)
PREALB SERPL-MCNC: 11 MG/DL (ref 18–38)

## 2020-02-03 PROCEDURE — 36415 COLL VENOUS BLD VENIPUNCTURE: CPT

## 2020-02-03 PROCEDURE — 770020 HCHG ROOM/CARE - TELE (206)

## 2020-02-03 PROCEDURE — 97116 GAIT TRAINING THERAPY: CPT

## 2020-02-03 PROCEDURE — 97530 THERAPEUTIC ACTIVITIES: CPT

## 2020-02-03 PROCEDURE — 700102 HCHG RX REV CODE 250 W/ 637 OVERRIDE(OP): Performed by: INTERNAL MEDICINE

## 2020-02-03 PROCEDURE — 86140 C-REACTIVE PROTEIN: CPT

## 2020-02-03 PROCEDURE — 84134 ASSAY OF PREALBUMIN: CPT

## 2020-02-03 PROCEDURE — 99232 SBSQ HOSP IP/OBS MODERATE 35: CPT | Performed by: INTERNAL MEDICINE

## 2020-02-03 PROCEDURE — A9270 NON-COVERED ITEM OR SERVICE: HCPCS | Performed by: INTERNAL MEDICINE

## 2020-02-03 RX ADMIN — ASPIRIN 81 MG 81 MG: 81 TABLET ORAL at 04:53

## 2020-02-03 RX ADMIN — LOSARTAN POTASSIUM 25 MG: 50 TABLET, FILM COATED ORAL at 04:51

## 2020-02-03 RX ADMIN — CLOPIDOGREL BISULFATE 75 MG: 75 TABLET ORAL at 04:52

## 2020-02-03 RX ADMIN — MONTELUKAST 10 MG: 10 TABLET, FILM COATED ORAL at 04:55

## 2020-02-03 RX ADMIN — OMEPRAZOLE 40 MG: KIT at 04:50

## 2020-02-03 RX ADMIN — FLUTICASONE PROPIONATE 220 MCG: 110 AEROSOL, METERED RESPIRATORY (INHALATION) at 21:21

## 2020-02-03 RX ADMIN — ALBUTEROL SULFATE 2 PUFF: 90 AEROSOL, METERED RESPIRATORY (INHALATION) at 05:04

## 2020-02-03 RX ADMIN — LEVOTHYROXINE SODIUM 175 MCG: 150 TABLET ORAL at 04:54

## 2020-02-03 RX ADMIN — FLUTICASONE PROPIONATE 220 MCG: 110 AEROSOL, METERED RESPIRATORY (INHALATION) at 09:51

## 2020-02-03 ASSESSMENT — GAIT ASSESSMENTS
DEVIATION: BRADYKINETIC
GAIT LEVEL OF ASSIST: MINIMAL ASSIST
DISTANCE (FEET): 200
ASSISTIVE DEVICE: FRONT WHEEL WALKER

## 2020-02-03 ASSESSMENT — COGNITIVE AND FUNCTIONAL STATUS - GENERAL
CLIMB 3 TO 5 STEPS WITH RAILING: A LITTLE
TURNING FROM BACK TO SIDE WHILE IN FLAT BAD: A LITTLE
WALKING IN HOSPITAL ROOM: A LITTLE
MOVING TO AND FROM BED TO CHAIR: UNABLE
SUGGESTED CMS G CODE MODIFIER MOBILITY: CK
MOVING FROM LYING ON BACK TO SITTING ON SIDE OF FLAT BED: A LOT
MOBILITY SCORE: 15
STANDING UP FROM CHAIR USING ARMS: A LITTLE

## 2020-02-03 NOTE — CARE PLAN
Problem: Communication  Goal: The ability to communicate needs accurately and effectively will improve  Outcome: PROGRESSING AS EXPECTED  Note:   Pt. Is A&O x 3, not oriented to event. Pt. Requires reorientation, yes or no questions, and patience to respond. Follows commands and responds appropriately. Will continue to round hourly and encourage the Pt. To ask questions and voice feelings.            Problem: Safety  Goal: Will remain free from injury  Outcome: PROGRESSING AS EXPECTED  Note:   Pt is a one-person assist with a FWW. Room has adequate lighting, is free of clutter, call light is within reach, and bed is locked and in the lowest position. Will continue to hourly round.

## 2020-02-03 NOTE — CARE PLAN
Problem: Safety  Goal: Will remain free from injury  Note:   Bed locked and in lowest position. Call light and personal belongings in reach. Bed alarm in place. Hourly rounding.       Problem: Skin Integrity  Goal: Risk for impaired skin integrity will decrease  Note:   Waffle overlay mattress in use. Q2 turns. Pillows in use for support and positioning. Barrier wipes in use.

## 2020-02-03 NOTE — PROGRESS NOTES
Hospital Medicine Daily Progress Note    Date of Service  2/3/2020    Chief Complaint  79 y.o. male admitted 1/31/2020 with weakness     Hospital Course    This is a 78 y/o M with PMHX of CVA, who was brought in to ER by his daughter for weakness and confusion.  Pt has been admitted for work up to r/o any new CVA.        Interval Problem Update  2/2: Pt seen and examined, resting in bed, MRI neg for CVA, shows cerebral atrophy and arachnoid cyst. Pt has failed swallow eval and speech has recommended for tube feed.  Pt also with dementia   Discussed with daughter today who is the POA and also with his wife, pt initially said no to tube feed but now states is ok.   Palliative team also consulted.  Will start tube feed today, and family will get together to make final decision on how to proceed later.   2/3: No overnight events afebrile, pt resting in bed, on tube feeds. As he failed swallow eval.  Daughter flying in today and palliative team also consulted will meet with daughter and decide if they want to cont tube feed or not and regarding his advance care goals.    Consultants/Specialty  Palliative team      Code Status  Full Code     Disposition  TBD     Review of Systems  Review of Systems   Unable to perform ROS: Dementia        Physical Exam  Temp:  [36.7 °C (98 °F)-36.9 °C (98.4 °F)] 36.8 °C (98.2 °F)  Pulse:  [72-92] 82  Resp:  [16-21] 20  BP: (125-158)/(71-99) 158/99  SpO2:  [90 %-93 %] 93 %    Physical Exam  Vitals signs and nursing note reviewed.   Constitutional:       General: He is not in acute distress.     Appearance: Normal appearance.   HENT:      Head: Normocephalic and atraumatic.      Nose: Nose normal.      Mouth/Throat:      Mouth: Mucous membranes are moist.   Eyes:      General:         Right eye: No discharge.         Left eye: No discharge.      Extraocular Movements: Extraocular movements intact.      Pupils: Pupils are equal, round, and reactive to light.   Neck:      Musculoskeletal:  Normal range of motion and neck supple.   Cardiovascular:      Rate and Rhythm: Normal rate and regular rhythm.   Pulmonary:      Effort: Pulmonary effort is normal.      Breath sounds: Normal breath sounds.   Abdominal:      General: Abdomen is flat. There is no distension.      Tenderness: There is no tenderness. There is no guarding or rebound.   Musculoskeletal: Normal range of motion.         General: No swelling or deformity.   Skin:     General: Skin is warm and dry.   Neurological:      General: No focal deficit present.      Mental Status: He is alert.      Comments: Mild dysarthria  Speak in 1-2 words   Psychiatric:         Mood and Affect: Mood normal. Affect is blunt and flat.         Behavior: Behavior is slowed.         Cognition and Memory: Cognition is impaired.         Fluids    Intake/Output Summary (Last 24 hours) at 2/3/2020 1317  Last data filed at 2/3/2020 0803  Gross per 24 hour   Intake 30 ml   Output --   Net 30 ml       Laboratory  Recent Labs     01/31/20  1352 02/01/20  0511   WBC 7.8 7.5   RBC 5.79 5.41   HEMOGLOBIN 17.2 15.4   HEMATOCRIT 52.1* 49.3   MCV 90.0 91.1   MCH 29.7 28.5   MCHC 33.0* 31.2*   RDW 43.9 44.5   PLATELETCT 274 243   MPV 9.0 8.7*     Recent Labs     01/31/20  1352 02/01/20  0511   SODIUM 144 142   POTASSIUM 3.9 4.0   CHLORIDE 108 108   CO2 27 25   GLUCOSE 96 91   BUN 17 18   CREATININE 0.96 0.99   CALCIUM 9.6 9.0             Recent Labs     02/01/20  0511   TRIGLYCERIDE 84   HDL 44   LDL 83       Imaging  DX-ABDOMEN FOR TUBE PLACEMENT   Final Result      Feeding tube tip overlies the gastric body.      MR-BRAIN-W/O   Final Result         1. Age-related cerebral atrophy. Marked dilatation of the lateral ventricles and third ventricle and the possibility of normal pressure hydrocephalus should be considered.      2. Mild periventricular white matter changes consistent with chronic microvascular ischemic gliosis.      3. Moderate-sized arachnoid cyst arising in the  anterior medial aspect left middle cranial fossa and extending into the suprasellar cistern.         4. Small crescentic area of chronic lacunar type infarction involving the left caudate nucleus      CT-HEAD W/O   Final Result      1.  Cerebral atrophy.      2.  White matter lucencies most consistent with small vessel ischemic change versus demyelination or gliosis.      3. No evidence of acute cerebral infarction, hemorrhage or mass lesion.      4. Prominent ventricles likely related to central atrophy versus normal pressure hydrocephalus.      5. CSF attenuation structure in the left middle cranial fossa likely representing arachnoid cyst versus prominence of the subdural space related to underlying atrophy.      DX-CHEST-PORTABLE (1 VIEW)   Final Result      Hypoinflation without other evidence for acute cardiopulmonary disease.           Assessment/Plan  Dysphagia  Assessment & Plan  Has failed swallow eval initially had refused the tube feed now his wife at bedside and pt states ok with tube feed  Also discussed with his daughter today who is the POA and updated on plan of care.  Will start on tube feed today and daughter is flying in tomorrow and family will get together and make a final decision on how to go from here  Palliative team has also been consulted   Pt has dementia        Encephalopathy  Assessment & Plan  CT head without contrast negative for acute findings  MRI brain showing brain atrophy, enlarged ventricles, arachnoid cyst, old CVA but no acute cva  ? NPH will need to follow up with neurology as outpt, I have discussed with neurology and they will arrange for follow up outpt.  Fall precautions  Pt has dementia   Palliative team also consulted     PAF (paroxysmal atrial fibrillation) (HCC)- (present on admission)  Assessment & Plan  Heart rate is under control  Not on any rate controlling medication  We will consider starting Eliquis for stroke prevention by discharge       History of CVA  (cerebrovascular accident)- (present on admission)  Assessment & Plan  Thought to be embolic in nature.  During previous hospitalization in December 2019 patient was going to be started on Eliquis due to history of A. Fib  We will continue aspirin and Plavix.  We will consider starting Eliquis    History of hypothyroidism- (present on admission)  Assessment & Plan  We will recheck TSH  Continue levothyroxine 175 mcg a day       VTE prophylaxis: scd

## 2020-02-03 NOTE — PALLIATIVE CARE
Palliative Care follow-up  Arrived at the bedside, pts daughter has not arrived from [place] at this time. Spoke with pt briefly and provided update that I would return to meet with him and his daughter hopefully later today to assist with the POC/GOC. Spoke with beside RN who is aware of plan.     Spoke with Dr Li, discussed pts case.     Updated: Unit DIPTI Steiner    Plan: Meet with pt and family once they have arrived from [place] to discuss POC/GOC. Full consult to follow.     Thank you for allowing Palliative Care to support this patient and family. Contact [phone] for additional assistance, change in patient status, or with any questions/concerns.

## 2020-02-03 NOTE — DISCHARGE PLANNING
Anticipated Discharge Disposition: TBD    Action: LSW staffed pt with Octavia with palliative care. Octavia informed that she would be meeting with pt's daughter, Merline this afternoon. Merline traveling from [place].     Barriers to Discharge: None    Plan: LSW to f/u with palliative care to assist with d/c plan after dicsussion, LSW to assist as needed

## 2020-02-03 NOTE — PROGRESS NOTES
Monitor summary: SR 72-82, DC 0.24, QRS 0.08, QT 0.36, with rare PVCs & PACs, per strip from monitor room.

## 2020-02-04 PROBLEM — E03.9 HYPOTHYROIDISM: Status: ACTIVE | Noted: 2019-12-03

## 2020-02-04 PROBLEM — E53.8 VITAMIN B12 DEFICIENCY: Status: ACTIVE | Noted: 2020-02-04

## 2020-02-04 PROBLEM — G91.2 NPH (NORMAL PRESSURE HYDROCEPHALUS) (HCC): Status: ACTIVE | Noted: 2020-02-04

## 2020-02-04 PROBLEM — D72.829 LEUKOCYTOSIS: Status: ACTIVE | Noted: 2020-02-04

## 2020-02-04 LAB
ANION GAP SERPL CALC-SCNC: 10 MMOL/L (ref 0–11.9)
BUN SERPL-MCNC: 18 MG/DL (ref 8–22)
CALCIUM SERPL-MCNC: 9.6 MG/DL (ref 8.5–10.5)
CHLORIDE SERPL-SCNC: 103 MMOL/L (ref 96–112)
CO2 SERPL-SCNC: 25 MMOL/L (ref 20–33)
CREAT SERPL-MCNC: 0.85 MG/DL (ref 0.5–1.4)
ERYTHROCYTE [DISTWIDTH] IN BLOOD BY AUTOMATED COUNT: 40.2 FL (ref 35.9–50)
GLUCOSE SERPL-MCNC: 91 MG/DL (ref 65–99)
HCT VFR BLD AUTO: 50.7 % (ref 42–52)
HGB BLD-MCNC: 16.6 G/DL (ref 14–18)
MCH RBC QN AUTO: 28.8 PG (ref 27–33)
MCHC RBC AUTO-ENTMCNC: 32.7 G/DL (ref 33.7–35.3)
MCV RBC AUTO: 87.9 FL (ref 81.4–97.8)
PLATELET # BLD AUTO: 290 K/UL (ref 164–446)
PMV BLD AUTO: 8.7 FL (ref 9–12.9)
POTASSIUM SERPL-SCNC: 3.8 MMOL/L (ref 3.6–5.5)
RBC # BLD AUTO: 5.77 M/UL (ref 4.7–6.1)
SODIUM SERPL-SCNC: 138 MMOL/L (ref 135–145)
WBC # BLD AUTO: 15.8 K/UL (ref 4.8–10.8)

## 2020-02-04 PROCEDURE — 95816 EEG AWAKE AND DROWSY: CPT | Performed by: PSYCHIATRY & NEUROLOGY

## 2020-02-04 PROCEDURE — 36415 COLL VENOUS BLD VENIPUNCTURE: CPT

## 2020-02-04 PROCEDURE — 80048 BASIC METABOLIC PNL TOTAL CA: CPT

## 2020-02-04 PROCEDURE — 4A00X4Z MEASUREMENT OF CENTRAL NERVOUS ELECTRICAL ACTIVITY, EXTERNAL APPROACH: ICD-10-PCS | Performed by: PSYCHIATRY & NEUROLOGY

## 2020-02-04 PROCEDURE — 99232 SBSQ HOSP IP/OBS MODERATE 35: CPT | Performed by: FAMILY MEDICINE

## 2020-02-04 PROCEDURE — 770001 HCHG ROOM/CARE - MED/SURG/GYN PRIV*

## 2020-02-04 PROCEDURE — 85027 COMPLETE CBC AUTOMATED: CPT

## 2020-02-04 PROCEDURE — 95816 EEG AWAKE AND DROWSY: CPT | Mod: 26 | Performed by: PSYCHIATRY & NEUROLOGY

## 2020-02-04 RX ORDER — ATROPINE SULFATE 10 MG/ML
2 SOLUTION/ DROPS OPHTHALMIC EVERY 4 HOURS PRN
Status: DISCONTINUED | OUTPATIENT
Start: 2020-02-04 | End: 2020-02-10 | Stop reason: HOSPADM

## 2020-02-04 RX ORDER — ONDANSETRON 2 MG/ML
8 INJECTION INTRAMUSCULAR; INTRAVENOUS EVERY 8 HOURS PRN
Status: DISCONTINUED | OUTPATIENT
Start: 2020-02-04 | End: 2020-02-04

## 2020-02-04 RX ORDER — ACETAMINOPHEN 325 MG/1
650 TABLET ORAL EVERY 4 HOURS PRN
Status: DISCONTINUED | OUTPATIENT
Start: 2020-02-04 | End: 2020-02-10 | Stop reason: HOSPADM

## 2020-02-04 RX ORDER — MORPHINE SULFATE 10 MG/ML
10 INJECTION, SOLUTION INTRAMUSCULAR; INTRAVENOUS
Status: DISCONTINUED | OUTPATIENT
Start: 2020-02-04 | End: 2020-02-10 | Stop reason: HOSPADM

## 2020-02-04 RX ORDER — MORPHINE SULFATE 100 MG/5ML
10 SOLUTION ORAL
Status: DISCONTINUED | OUTPATIENT
Start: 2020-02-04 | End: 2020-02-10 | Stop reason: HOSPADM

## 2020-02-04 RX ORDER — ONDANSETRON 4 MG/1
8 TABLET, ORALLY DISINTEGRATING ORAL EVERY 8 HOURS PRN
Status: DISCONTINUED | OUTPATIENT
Start: 2020-02-04 | End: 2020-02-10 | Stop reason: HOSPADM

## 2020-02-04 RX ORDER — POLYVINYL ALCOHOL 14 MG/ML
2 SOLUTION/ DROPS OPHTHALMIC EVERY 6 HOURS PRN
Status: DISCONTINUED | OUTPATIENT
Start: 2020-02-04 | End: 2020-02-10 | Stop reason: HOSPADM

## 2020-02-04 RX ORDER — LORAZEPAM 2 MG/ML
1 INJECTION INTRAMUSCULAR
Status: DISCONTINUED | OUTPATIENT
Start: 2020-02-04 | End: 2020-02-10 | Stop reason: HOSPADM

## 2020-02-04 RX ORDER — ACETAMINOPHEN 650 MG/1
650 SUPPOSITORY RECTAL EVERY 4 HOURS PRN
Status: DISCONTINUED | OUTPATIENT
Start: 2020-02-04 | End: 2020-02-10 | Stop reason: HOSPADM

## 2020-02-04 RX ORDER — LORAZEPAM 2 MG/ML
1 CONCENTRATE ORAL
Status: DISCONTINUED | OUTPATIENT
Start: 2020-02-04 | End: 2020-02-10 | Stop reason: HOSPADM

## 2020-02-04 RX ADMIN — FLUTICASONE PROPIONATE 220 MCG: 110 AEROSOL, METERED RESPIRATORY (INHALATION) at 09:41

## 2020-02-04 RX ADMIN — FLUTICASONE PROPIONATE 220 MCG: 110 AEROSOL, METERED RESPIRATORY (INHALATION) at 19:34

## 2020-02-04 NOTE — CONSULTS
Reason for PC Consult: Advance Care Planning    Consulted by: Dr Li    Assessment:  General: 80 yo male admitted on 1/31/2020 for Stroke-like symptoms. PMH: cough, Dyslipidemia, HTN, CVA-12/3/2019, Hypothyroidism, Tobacco use, PAF, post implantable loop recorder-12/3/2019.     Pt presented to ED due to increasing weakness for approx 3 days. Pt aware of self, place and year, but not event or reason for presentation. CT of head and chest unrevealing. Pt admitted for further work up.     Dyspnea: No- resp rate 20, 92% on room air.   Last BM: 02/01/20-    Pain: No- pt denies  Depression: Mood appropriate for situation-    Dementia: Yes;  6, E    Spiritual:  Is [employer] or spirituality important for coping with this illness? Yes-    Has a  or spiritual provider visit been requested? No    Palliative Performance Scale: 50%    Advance Directive: None on file.   DPOA: No, NOK Merline [phone], daughter.    POLST: None on file.      Code Status: DNR/DNI     Outcome:  Met with pts daughter Merline, her spouse Segundo, and the pts son Manuel who arrived from [facility]. Introduced self and the role of Palliative Care. Sat with pts children in the family room off nurses station. Family had just had clinical update from Dr Li. Discussed cortrac, explained that feeding tubes do not preclude aspiration on saliva. Spoke about quality of life, food for comfort and taste despite risk. Spoke about Hospice philosophy, quality at end of life, and comfort. Family feels that according the pt AD, these would be his wishes, to have a diet for pleasure despite risks but wish to have assistance with this discussion with their father as they feel they wish to include him, despite some level of dementia, in this conversation.     Family provided background info that pt has been living with his spouse at [facility] assisted living but on the independent side. Merline spoke about her the difficulties with their mother, who also has  some level of dementia but has refused to see an MD, also that pts spouse is an alcoholic and it can be very difficult to have reasonable conversations with her about her  and POC.   Merline acknowledges that they would override her if needed due to her dementia and inability to make sound decisions on the pts behalf or in his best interests but is hoping that they can have a productive conversation with her at a family meeting, possibly tomorrow also.     Family would like to meet with their father alone prior in order to speak with him as a group on the POC/GOC. Family feels that the best interest of the pt would be to allow diet for pleasure and look at Hospice, possibly as SNF, or GH, as pt does have Long Term Insurance Policy per family and they feel that it is in everyones best interest for their parents to reside in separate arrangements where their mother can visit but is not the care giver as this is beyond her.     Agreed to have a family meeting at the bedside tomorrow at 10:00,Merline stated that she will bring in the pts AD then as it is currently at the hotel.   Then will move to family room and meet with pts spouse, June, at 11:00 for further discussion.     Updated: Dr Li, BS LATRICE Saunders, Charge LATRICE Colbert    Plan: Meet with pt, spouse, and adult children tomorrow, 2/4, to continue conversation on POC/GOC.     Thank you for allowing Palliative Care to participate in this patient's care. Please feel free to call [phone] with any questions or concerns.

## 2020-02-04 NOTE — PROGRESS NOTES
Assumed care of pt at 0700. Pt AAOx2-3 throughout shift. No complaints of pain. Pt NPO with cortrak in place, tolerating tube feeding. Tele monitor in place. Pt ambulates with x1 assist with FFW. Fall, aspiration and seizure precautions in place. Family arrived today from [place], met with MD and palliative care RN. Plan for family meeting at bedside at 1000.

## 2020-02-04 NOTE — PALLIATIVE CARE
Palliative Care follow-up  Returned to pts room for follow up with family on Hospice and placement options. Family has not returned to the bedside at this time.   Attempted to call Merline but voicemail was full. Updated LATRICE Evans that if Merline returned and wished to go over Hospice information to just call the office.     Spoke with Segundo, spoke about working with his family in order to DC out of the Hospital setting, pt feels happy to know that his family is working on a plan, and wishes to have a glass of wine.     Updated: JAYNE Evans    Plan: Meet with family to discuss Hospice and placement.     Thank you for allowing Palliative Care to support this patient and family. Contact [phone] for additional assistance, change in patient status, or with any questions/concerns.

## 2020-02-04 NOTE — PROGRESS NOTES
Monitor summary: SR 77-95, VA 0.22, QRS 0.08, QT 0.40, with rare PVCs and 1st degree block per strip from monitor room.

## 2020-02-04 NOTE — CARE PLAN
Problem: Nutritional:  Goal: Nutrition support tolerated and meeting greater than 85% of estimated needs  Outcome: NOT MET    Per nursing progress note, pt pulled out Cortrak and is refusing replacement despite nurse's encouragement and explanation of need for tube feed. Palliative is following and is planning on meeting with family today. RD will monitor.

## 2020-02-04 NOTE — PROCEDURES
ROUTINE ELECTROENCEPHALOGRAM REPORT      Referring provider: Dr. Person.     DOS: 2/4/2020 (total recording of 26 minutes)    INDICATION:  Segundo Falk 79 y.o. male presenting with altered mental status, weakness.    CURRENT ANTIEPILEPTIC REGIMEN: None.    TECHNIQUE: 30 channel routine electroencephalogram (EEG) was performed in accordance with the international 10-20 system. The study was reviewed in bipolar and referential montages. The recording examined the patient during wakeful and drowsy state(s).     DESCRIPTION OF THE RECORD:  During the wakefulness, the background showed a symmetrical 8 hz alpha activity posteriorly with amplitude of 70 mV.  There was reactivity to eye closure/opening.  A normal anterior-posterior gradient was noted with faster beta frequencies seen anteriorly.  During drowsiness, theta/delta frequencies were seen.    ACTIVATION PROCEDURES:   Intermittent Photic stimulation was performed in a stepwise fashion from 1 to 30 Hz and elicited a normal response (photic driving), most noticeable in the posterior leads.      ICTAL AND/OR INTERICTAL FINDINGS:   No focal or generalized epileptiform activity noted. No regional slowing was seen during this routine study.  No clinical events or seizures were reported or recorded during the study.     EKG: sampling of the EKG recording demonstrated sinus rhythm.       INTERPRETATION:  This is a normal routine EEG recording in the awake and drowsy state(s).  Clinical correlation is recommended.    Note: A normal EEG does not rule out epilepsy.  If the clinical suspicion remains high for seizures, a prolonged recording to capture clinical or subclinical events may be helpful.        Ismael Cortes MD   Epilepsy and Neurodiagnostics.   Clinical  of Neurology [facility].   Diplomate in Neurology, Epilepsy, and Electrodiagnostic Medicine.   Office: [phone]  Fax: [phone]

## 2020-02-04 NOTE — CARE PLAN
Problem: Communication  Goal: The ability to communicate needs accurately and effectively will improve  Outcome: PROGRESSING SLOWER THAN EXPECTED  Note:   Needs frequent reorientation.     Problem: Safety  Goal: Will remain free from falls  Outcome: PROGRESSING AS EXPECTED  Note:   Fall precautions in place.

## 2020-02-04 NOTE — PALLIATIVE CARE
"Palliative Care follow-up  Arrived at the unit for family meeting, no family present. Family arrived a few minutes later, but pt being prepped for EEG. Pts son Manuel became very distraught, felt that procedures were being put ahead of pt and family meeting, let Manuel know I would speak with MD and that if they wished they could have this cancelled.   Spoke with Dr Person who stated that they were only wishing to rule out any seizure activity that could be contributing to pts situation.   Returned and spoke with family. After some discussion with family on time of test, fact that testing was already underway, is noninvasive and pt comfortable, family  accepting of situation and wished for testing to continue.     Moved to family room, began looking over patients AD and DPOA paperwork when pts spouse, Bhavani arrived early. Bhavani also has some level of dementia and has been struggling. Sat with pts spouse Bhavani, son Manuel, daughter Merline and her spouse Segundo, a granddaughter, and family friend, Darlene.   Began with discussion about pts stroke several years ago with Bhavani. Spoke about his increasing weakness and now with the complication of inability to swallow.   Spoke about quality of life and the life that they have had together. Spoke about pts initial refusal of cortrac, then allowing for placement after speaking with his wife and MD, and then subsequent pulling of cortrac and refusal for it to be placed again.   Spoke about inability of \"fixing \" her , and again on quality of life and what he would want.    Bhavani struggled with thought that her spouse may die before her and would not be there to care for her. Validated Saúl fears, spoke about her loving family and their support.   Bhavani acknowledged that her  would not wish to live encumbered with disability of this level, she voiced her fear that this \"was coming, when he didn't open eyes for me last night\".     Bhavani and family had questions on \"whats " "next\" . Spoke about CC while plan is worked on so that would be able to have diet for pleasure, any medications needed and appropriate to manage his symptoms.   Spoke about possibility of moving to  or SNF, as Merline had stated that her parents have Long Term Care insurance. Explained GH to family, pts spouse anxious that she would be able to visit the pt and is unable to drive. Let her know that family would be able to ases and assist with placement and looking at the s and where they are located.   [place] [facility]     Met with Manuel and pt at the bedside, spoke on pts situation and diet for pleasure, despite risks. Pt tearful but accepting, and glad that his family is there.     Met with Merline again outside of the room, Merline asked to go over further information on placement and hospice later. Let her know I have a family meeting at 12:00 and would return when that meeting was over.     Verified that family has Palliative Team contact number.    Spoke with Dr Person,  Discussed families wishes for CC with diet for pleasure while a plan is put into place.    Updated: Dr Person, BS LATRICE Evans,     Plan: Pt currently on CC, will work with family on Hospice choice and to begin placement.     Thank you for allowing Palliative Care to support this patient and family. Contact [phone] for additional assistance, change in patient status, or with any questions/concerns.   "

## 2020-02-04 NOTE — THERAPY
"Physical Therapy Treatment completed.   Bed Mobility:  Supine to Sit: Moderate Assist  Transfers: Sit to Stand: Supervised  Gait: Level Of Assist: Minimal Assist with Front-Wheel Walker    Plan of Care: Will benefit from Physical Therapy 3 times per week  Discharge Recommendations: Equipment: Will Continue to Assess for Equipment Needs. Recommend post-acute placement for continued physical therapy services prior to discharge home.       See \"Rehab Therapy-Acute\" Patient Summary Report for complete documentation.     Pt seen for PT treatment session. Is motivated to participate. Pt required mod A for supine > sit, had some difficulty with bringing LEs off bed and sequencing trunk to upright. Upon initial standing, pt requesting to walk to the toilet however urinated on the floor. Pt then ambulated 200ft with FWW and min A for safety and balance. Pt demonstrated step-through gait but tends to push walker too far out in front of him. Will continue to work with pt in acute setting. Continue to recommend postacute placement for therapy prior to return home independently.   "

## 2020-02-04 NOTE — THERAPY
Attempted to see pt for OT tx. Pt awaiting POC and possible transition to comfort care. Will discuss POC w/ OT regarding, goals, frequency and d/c planning. Will try again later as appropriate.

## 2020-02-04 NOTE — PROGRESS NOTES
aaox1-2.disoirented & confused with events & place.needs frequent reorientation education of situation.pulled out cotrak.multiple attempts to convince to insert the cortrak and the need of tube feeing but he refused.informed charge RN.also keep on removing leads of telemonitor.keep on reminding him not to remove it.patient able to sleep.continuing hourly rounding.

## 2020-02-05 PROBLEM — G91.9 HYDROCEPHALUS (HCC): Status: ACTIVE | Noted: 2020-02-04

## 2020-02-05 PROCEDURE — 36415 COLL VENOUS BLD VENIPUNCTURE: CPT

## 2020-02-05 PROCEDURE — 86480 TB TEST CELL IMMUN MEASURE: CPT

## 2020-02-05 PROCEDURE — 700102 HCHG RX REV CODE 250 W/ 637 OVERRIDE(OP): Performed by: INTERNAL MEDICINE

## 2020-02-05 PROCEDURE — A9270 NON-COVERED ITEM OR SERVICE: HCPCS | Performed by: INTERNAL MEDICINE

## 2020-02-05 PROCEDURE — 700102 HCHG RX REV CODE 250 W/ 637 OVERRIDE(OP): Performed by: FAMILY MEDICINE

## 2020-02-05 PROCEDURE — A9270 NON-COVERED ITEM OR SERVICE: HCPCS | Performed by: FAMILY MEDICINE

## 2020-02-05 PROCEDURE — 770001 HCHG ROOM/CARE - MED/SURG/GYN PRIV*

## 2020-02-05 PROCEDURE — 99231 SBSQ HOSP IP/OBS SF/LOW 25: CPT | Performed by: FAMILY MEDICINE

## 2020-02-05 RX ORDER — OMEPRAZOLE 20 MG/1
20 CAPSULE, DELAYED RELEASE ORAL DAILY
Status: DISCONTINUED | OUTPATIENT
Start: 2020-02-06 | End: 2020-02-10 | Stop reason: HOSPADM

## 2020-02-05 RX ORDER — AMOXICILLIN 250 MG
2 CAPSULE ORAL 2 TIMES DAILY
Status: DISCONTINUED | OUTPATIENT
Start: 2020-02-05 | End: 2020-02-10 | Stop reason: HOSPADM

## 2020-02-05 RX ORDER — MONTELUKAST SODIUM 10 MG/1
10 TABLET ORAL DAILY
Status: DISCONTINUED | OUTPATIENT
Start: 2020-02-06 | End: 2020-02-10 | Stop reason: HOSPADM

## 2020-02-05 RX ORDER — LOSARTAN POTASSIUM 50 MG/1
25 TABLET ORAL DAILY
Status: DISCONTINUED | OUTPATIENT
Start: 2020-02-06 | End: 2020-02-10 | Stop reason: HOSPADM

## 2020-02-05 RX ORDER — POLYETHYLENE GLYCOL 3350 17 G/17G
1 POWDER, FOR SOLUTION ORAL
Status: DISCONTINUED | OUTPATIENT
Start: 2020-02-05 | End: 2020-02-10 | Stop reason: HOSPADM

## 2020-02-05 RX ORDER — BISACODYL 10 MG
10 SUPPOSITORY, RECTAL RECTAL
Status: DISCONTINUED | OUTPATIENT
Start: 2020-02-05 | End: 2020-02-10 | Stop reason: HOSPADM

## 2020-02-05 RX ORDER — CLOPIDOGREL BISULFATE 75 MG/1
75 TABLET ORAL DAILY
Status: DISCONTINUED | OUTPATIENT
Start: 2020-02-06 | End: 2020-02-10 | Stop reason: HOSPADM

## 2020-02-05 RX ADMIN — LOSARTAN POTASSIUM 25 MG: 50 TABLET, FILM COATED ORAL at 04:54

## 2020-02-05 RX ADMIN — OMEPRAZOLE 40 MG: KIT at 04:52

## 2020-02-05 RX ADMIN — MINERAL OIL, PETROLATUM 1 APPLICATION: 425; 573 OINTMENT OPHTHALMIC at 21:02

## 2020-02-05 RX ADMIN — CLOPIDOGREL BISULFATE 75 MG: 75 TABLET ORAL at 04:52

## 2020-02-05 RX ADMIN — MINERAL OIL, PETROLATUM 1 APPLICATION: 425; 573 OINTMENT OPHTHALMIC at 05:00

## 2020-02-05 RX ADMIN — LEVOTHYROXINE SODIUM 175 MCG: 150 TABLET ORAL at 04:53

## 2020-02-05 RX ADMIN — MONTELUKAST 10 MG: 10 TABLET, FILM COATED ORAL at 04:53

## 2020-02-05 RX ADMIN — SENNOSIDES AND DOCUSATE SODIUM 2 TABLET: 8.6; 5 TABLET ORAL at 04:52

## 2020-02-05 NOTE — PROGRESS NOTES
[facility] Medicine Daily Progress Note    Date of Service  2/5/2020    Chief Complaint  79 y.o. male admitted 1/31/2020 with encephalopathy.    Hospital Course  Admitted with encephalopathy, known history of CVA, with expressive aphasia, and normal pressure hydrocephalus    Interval Problem Update  Encephalopathy - appears to be chronic  Dysphagia -patient pulled coretrak out last night  HTN - sbp 130-150  Afib - currently sinus    Palliative care lengthy discussion with patient's family.  Family has decided to place patient on comfort care and pursue hospice.    Consultants/Specialty  Palliative care    Code Status  DNR/DNI/comfort care    Disposition  Hospice?    Review of Systems  Review of Systems   Unable to perform ROS: Medical condition        Physical Exam  Temp:  [36.2 °C (97.1 °F)-36.4 °C (97.6 °F)] 36.2 °C (97.1 °F)  Pulse:  [71-89] 71  Resp:  [18] 18  BP: (114-115)/(59-67) 114/59  SpO2:  [100 %] 100 %    Physical Exam  HENT:      Head: Normocephalic and atraumatic.      Nose: No congestion.      Mouth/Throat:      Mouth: Mucous membranes are moist.   Eyes:      Conjunctiva/sclera: Conjunctivae normal.      Pupils: Pupils are equal, round, and reactive to light.   Neck:      Musculoskeletal: No muscular tenderness.   Cardiovascular:      Rate and Rhythm: Normal rate and regular rhythm.   Pulmonary:      Effort: Pulmonary effort is normal.      Breath sounds: Normal breath sounds.   Abdominal:      General: Bowel sounds are normal. There is no distension.      Palpations: Abdomen is soft.      Tenderness: There is no tenderness. There is no guarding or rebound.   Musculoskeletal:      Right lower leg: No edema.      Left lower leg: No edema.   Lymphadenopathy:      Cervical: No cervical adenopathy.   Skin:     General: Skin is warm and dry.   Neurological:      Mental Status: He is alert.      Comments: Expressive aphasia  Does not really follow commands  Good  bilaterally     Psychiatric:          Attention and Perception: He is inattentive.         Speech: He is noncommunicative.         Behavior: Behavior is slowed.         Cognition and Memory: Cognition is impaired. Memory is impaired.         Fluids    Intake/Output Summary (Last 24 hours) at 2/5/2020 1031  Last data filed at 2/5/2020 0500  Gross per 24 hour   Intake 300 ml   Output 300 ml   Net 0 ml       Laboratory  Recent Labs     02/04/20  0345   WBC 15.8*   RBC 5.77   HEMOGLOBIN 16.6   HEMATOCRIT 50.7   MCV 87.9   MCH 28.8   MCHC 32.7*   RDW 40.2   PLATELETCT 290   MPV 8.7*     Recent Labs     02/04/20  0345   SODIUM 138   POTASSIUM 3.8   CHLORIDE 103   CO2 25   GLUCOSE 91   BUN 18   CREATININE 0.85   CALCIUM 9.6                   Imaging  DX-ABDOMEN FOR TUBE PLACEMENT   Final Result      Feeding tube tip overlies the gastric body.      MR-BRAIN-W/O   Final Result         1. Age-related cerebral atrophy. Marked dilatation of the lateral ventricles and third ventricle and the possibility of normal pressure hydrocephalus should be considered.      2. Mild periventricular white matter changes consistent with chronic microvascular ischemic gliosis.      3. Moderate-sized arachnoid cyst arising in the anterior medial aspect left middle cranial fossa and extending into the suprasellar cistern.         4. Small crescentic area of chronic lacunar type infarction involving the left caudate nucleus      CT-HEAD W/O   Final Result      1.  Cerebral atrophy.      2.  White matter lucencies most consistent with small vessel ischemic change versus demyelination or gliosis.      3. No evidence of acute cerebral infarction, hemorrhage or mass lesion.      4. Prominent ventricles likely related to central atrophy versus normal pressure hydrocephalus.      5. CSF attenuation structure in the left middle cranial fossa likely representing arachnoid cyst versus prominence of the subdural space related to underlying atrophy.      DX-CHEST-PORTABLE (1 VIEW)   Final Result       Hypoinflation without other evidence for acute cardiopulmonary disease.           Assessment/Plan  Hydrocephalus (HCC)- (present on admission)  Assessment & Plan  Comfort care    Dysphagia- (present on admission)  Assessment & Plan  NTL  Comfort care       Encephalopathy- (present on admission)  Assessment & Plan  Acute vs chronic?  Comfort care    Leukocytosis- (present on admission)  Assessment & Plan  Comfort care    Vitamin B12 deficiency- (present on admission)  Assessment & Plan  Comfort care    PAF (paroxysmal atrial fibrillation) (HCC)- (present on admission)  Assessment & Plan  Comfort care       History of CVA (cerebrovascular accident)- (present on admission)  Assessment & Plan  Plavix    Essential hypertension, benign- (present on admission)  Assessment & Plan  Losartan    Hypothyroidism- (present on admission)  Assessment & Plan  Levothyroxine        VTE prophylaxis: Comfort care

## 2020-02-05 NOTE — THERAPY
SPEECH THERAPY CONTACT NOTE:     Per EMR, patient has transitioned to comfort care. SLP will d/c services 2/2 same. Please reconsult if needed. Thank you.

## 2020-02-05 NOTE — PROGRESS NOTES
Alert,awake and somewhat oriented.still confused but more calm tonight as of last night.no sign & symptom of pain.on comfort care status.continuing hourly rounding.

## 2020-02-05 NOTE — CARE PLAN
Problem: Skin Integrity  Goal: Skin Integrity is maintained  Outcome: PROGRESSING AS EXPECTED  Note:   Able to turn & reposition self     Problem: Pain  Goal: Alleviation of Pain or a reduction in pain to the patient's comfort goal  Outcome: PROGRESSING AS EXPECTED  Note:   No sign & symptom of pain.     Problem: Self Care Deficit  Goal: Oral Hygiene  Outcome: PROGRESSING AS EXPECTED  Note:   Chlorhexidine oral care

## 2020-02-05 NOTE — PROGRESS NOTES
Family meeting with Palliative RN at this time, POLST signed, quantiferon test ordered for placement plans.

## 2020-02-05 NOTE — PALLIATIVE CARE
Palliative Care follow-up  Met with pts daughter, son, and granddaughter in the family room. Discussed process of Hospice choice, provided with choice sheet, family wishes to review and call agencies prior to making choice.     Spoke about placement. Pts spouse, Bhavani lives at [facility], but she has early dementia and possible ETOH problem per family.  Discussed possibly having pt on Memory care side so that the pt and his wife would have a buffer between hem, but also that she would be able to visit with him easily. Unsure what level of care [facility] can accommodate.   Discussed utilizing extra paid caregivers for support, pts son Manuel, concerned that he does not wish to have his father die alone.   Discussed possibly using [facility] which is across the street from [facility], spoke about pts Long Term Care insurance and family feels they may be required to pay out of pocket for first three months before it covers anything.   Discussed GHs as another option, but unsure if any are available near [facility].     Discussed POLST for DC. POLST completed for DNR/DNI with Comfort Care.   Family stated that they will bring in copy of pts AD to be scanned, apologized for not taking copy yesterday.     Family to meet with  today to discuss guardianship for their mother and them they will speak with staff at [facility] to determine their level of care possible, and look at other GHs.     Family stated that they will update staff when they pick a Hospice Agency.     MD reviewed and singed POLST, POLST scanned into EMR, original placed on the hard chart for DC.     Updated: Dr Person, Unit CM RN, BS RN Dangelo    Plan: Continue to work with pt and family for any needs.     Thank you for allowing Palliative Care to support this patient and family. Contact [phone] for additional assistance, change in patient status, or with any questions/concerns.

## 2020-02-05 NOTE — THERAPY
Occupational Therapy Contact Note  Pt transitioned to comfort care; will d/c from OT, but please re-consult if change in POC.   Lala LAZO, OTR/L    Pager #[phone]

## 2020-02-05 NOTE — PALLIATIVE CARE
Palliative Care follow-up  Called and spoke with Merline, pts daughter, on meeting to look at Dc and Hospice options. Merline requested to meet bedside at approx 09:30.    Updated: JAYNE RN    Plan: Work with family on choice for Hospice, POLST, and placement options.     Thank you for allowing Palliative Care to support this patient and family. Contact [phone] for additional assistance, change in patient status, or with any questions/concerns.

## 2020-02-05 NOTE — PROGRESS NOTES
[facility] Medicine Daily Progress Note    Date of Service  2/5/2020    Chief Complaint  79 y.o. male admitted 1/31/2020 with encephalopathy.    Hospital Course  Admitted with encephalopathy, known history of CVA, with expressive aphasia, and normal pressure hydrocephalus    Interval Problem Update  Encephalopathy - chronic?  Dysphagia - per RN seems to be tolerating meds and NTL    Discussed case with Palliative care    Consultants/Specialty  Palliative care    Code Status  DNR/DNI/comfort care    Disposition  Hospice    Review of Systems  Review of Systems   Unable to perform ROS: Medical condition        Physical Exam  Temp:  [36.2 °C (97.1 °F)-36.4 °C (97.6 °F)] 36.2 °C (97.1 °F)  Pulse:  [71-89] 71  Resp:  [18] 18  BP: (114-115)/(59-67) 114/59  SpO2:  [100 %] 100 %    Physical Exam  HENT:      Head: Normocephalic and atraumatic.      Nose: No congestion.      Mouth/Throat:      Mouth: Mucous membranes are moist.   Eyes:      Conjunctiva/sclera: Conjunctivae normal.      Pupils: Pupils are equal, round, and reactive to light.   Neck:      Musculoskeletal: No muscular tenderness.   Cardiovascular:      Rate and Rhythm: Normal rate and regular rhythm.   Pulmonary:      Effort: Pulmonary effort is normal.      Breath sounds: Normal breath sounds.   Abdominal:      General: Bowel sounds are normal. There is no distension.      Palpations: Abdomen is soft.      Tenderness: There is no tenderness. There is no guarding or rebound.   Musculoskeletal:      Right lower leg: No edema.      Left lower leg: No edema.   Lymphadenopathy:      Cervical: No cervical adenopathy.   Skin:     General: Skin is warm and dry.   Neurological:      Mental Status: He is alert.      Comments: Expressive aphasia  Does not really follow commands  Good  bilaterally     Psychiatric:         Attention and Perception: He is inattentive.         Speech: He is noncommunicative.         Behavior: Behavior is slowed.         Cognition and  Memory: Cognition is impaired. Memory is impaired.         Fluids    Intake/Output Summary (Last 24 hours) at 2/5/2020 1032  Last data filed at 2/5/2020 0500  Gross per 24 hour   Intake 300 ml   Output 300 ml   Net 0 ml       Laboratory  Recent Labs     02/04/20  0345   WBC 15.8*   RBC 5.77   HEMOGLOBIN 16.6   HEMATOCRIT 50.7   MCV 87.9   MCH 28.8   MCHC 32.7*   RDW 40.2   PLATELETCT 290   MPV 8.7*     Recent Labs     02/04/20  0345   SODIUM 138   POTASSIUM 3.8   CHLORIDE 103   CO2 25   GLUCOSE 91   BUN 18   CREATININE 0.85   CALCIUM 9.6                   Imaging  DX-ABDOMEN FOR TUBE PLACEMENT   Final Result      Feeding tube tip overlies the gastric body.      MR-BRAIN-W/O   Final Result         1. Age-related cerebral atrophy. Marked dilatation of the lateral ventricles and third ventricle and the possibility of normal pressure hydrocephalus should be considered.      2. Mild periventricular white matter changes consistent with chronic microvascular ischemic gliosis.      3. Moderate-sized arachnoid cyst arising in the anterior medial aspect left middle cranial fossa and extending into the suprasellar cistern.         4. Small crescentic area of chronic lacunar type infarction involving the left caudate nucleus      CT-HEAD W/O   Final Result      1.  Cerebral atrophy.      2.  White matter lucencies most consistent with small vessel ischemic change versus demyelination or gliosis.      3. No evidence of acute cerebral infarction, hemorrhage or mass lesion.      4. Prominent ventricles likely related to central atrophy versus normal pressure hydrocephalus.      5. CSF attenuation structure in the left middle cranial fossa likely representing arachnoid cyst versus prominence of the subdural space related to underlying atrophy.      DX-CHEST-PORTABLE (1 VIEW)   Final Result      Hypoinflation without other evidence for acute cardiopulmonary disease.           Assessment/Plan  Hydrocephalus (HCC)- (present on  admission)  Assessment & Plan  Comfort care    Dysphagia- (present on admission)  Assessment & Plan  Dysphagia 1, NTL  Comfort care       Encephalopathy- (present on admission)  Assessment & Plan  Acute vs chronic?  Comfort care    Leukocytosis- (present on admission)  Assessment & Plan  Comfort care    Vitamin B12 deficiency- (present on admission)  Assessment & Plan  Comfort care    PAF (paroxysmal atrial fibrillation) (HCC)- (present on admission)  Assessment & Plan  Comfort care       History of CVA (cerebrovascular accident)- (present on admission)  Assessment & Plan  Plavix    Essential hypertension, benign- (present on admission)  Assessment & Plan  Losartan    Hypothyroidism- (present on admission)  Assessment & Plan  Levothyroxine        VTE prophylaxis: Comfort care

## 2020-02-05 NOTE — CARE PLAN
Problem: Discharge Barriers/Planning  Goal: Patient's continuum of care needs will be met  Note:   Awaiting hospice GH plans     Problem: Urinary Elimination:  Goal: Ability to reestablish a normal urinary elimination pattern will improve  Note:   Incontinence, condom cath in place

## 2020-02-06 PROCEDURE — 770001 HCHG ROOM/CARE - MED/SURG/GYN PRIV*

## 2020-02-06 PROCEDURE — 99231 SBSQ HOSP IP/OBS SF/LOW 25: CPT | Performed by: FAMILY MEDICINE

## 2020-02-06 NOTE — CARE PLAN
Problem: Safety  Goal: Free from accidental injury  Outcome: PROGRESSING AS EXPECTED     Problem: Psychosocial needs  Goal: Anxiety reduction  Outcome: PROGRESSING AS EXPECTED     Problem: Pain  Goal: Alleviation of Pain or a reduction in pain to the patient's comfort goal  Outcome: PROGRESSING AS EXPECTED

## 2020-02-06 NOTE — PROGRESS NOTES
Aaox2,has confusion but pleasant.needs reorientation at times.no signs and symptoms of pain.on comfort care status.patient able to sleep.continuing hourly rounding.

## 2020-02-06 NOTE — CARE PLAN
Problem: Nutritional:  Goal: Nutrition support tolerated and meeting greater than 85% of estimated needs  Outcome: DISCHARGED-GOAL NOT MET

## 2020-02-06 NOTE — DIETARY
Nutrition Services: Update    Pt transitioning to comfort care measures. TF discontinued, PO diet started (2/5) - intake 50-75% x3 meals.     RD available PRN.

## 2020-02-06 NOTE — PROGRESS NOTES
Assumed care of pt at 0700. Pt AAOx2-3. Ambulates x1 with FFW. Comfort care measures in place. Fall precautions in place. No s/s of pain noted. Will continue hourly rounding.

## 2020-02-06 NOTE — PROGRESS NOTES
Hospital Medicine Daily Progress Note    Date of Service  2/6/2020    Chief Complaint  79 y.o. male admitted 1/31/2020 with encephalopathy.    Hospital Course  Admitted with encephalopathy, known history of CVA, with expressive aphasia, and normal pressure hydrocephalus    Interval Problem Update  Encephalopathy - chronic?  Dysphagia - tolerating meds and NTL, consuming 85% of his meals    Consultants/Specialty  Palliative care    Code Status  DNR/DNI/comfort care    Disposition  Hospice    Review of Systems  Review of Systems   Unable to perform ROS: Medical condition        Physical Exam  Temp:  [35.9 °C (96.7 °F)-36.8 °C (98.3 °F)] 36.8 °C (98.3 °F)  Pulse:  [85-88] 85  Resp:  [19-20] 19  BP: (109-118)/(62-71) 118/71  SpO2:  [87 %-98 %] 98 %    Physical Exam  HENT:      Head: Normocephalic and atraumatic.      Nose: No congestion.      Mouth/Throat:      Mouth: Mucous membranes are moist.   Eyes:      Conjunctiva/sclera: Conjunctivae normal.      Pupils: Pupils are equal, round, and reactive to light.   Neck:      Musculoskeletal: No muscular tenderness.   Cardiovascular:      Rate and Rhythm: Normal rate and regular rhythm.   Pulmonary:      Effort: Pulmonary effort is normal.      Breath sounds: Normal breath sounds.   Abdominal:      General: Bowel sounds are normal. There is no distension.      Palpations: Abdomen is soft.      Tenderness: There is no tenderness. There is no guarding or rebound.   Musculoskeletal:      Right lower leg: No edema.      Left lower leg: No edema.   Lymphadenopathy:      Cervical: No cervical adenopathy.   Skin:     General: Skin is warm and dry.   Neurological:      Mental Status: He is alert.      Comments: Expressive aphasia  Does not really follow commands  Good  bilaterally     Psychiatric:         Attention and Perception: He is inattentive.         Speech: He is noncommunicative.         Behavior: Behavior is slowed.         Cognition and Memory: Cognition is impaired.  Memory is impaired.         Fluids    Intake/Output Summary (Last 24 hours) at 2/6/2020 1020  Last data filed at 2/6/2020 0900  Gross per 24 hour   Intake 200 ml   Output --   Net 200 ml       Laboratory  Recent Labs     02/04/20  0345   WBC 15.8*   RBC 5.77   HEMOGLOBIN 16.6   HEMATOCRIT 50.7   MCV 87.9   MCH 28.8   MCHC 32.7*   RDW 40.2   PLATELETCT 290   MPV 8.7*     Recent Labs     02/04/20  0345   SODIUM 138   POTASSIUM 3.8   CHLORIDE 103   CO2 25   GLUCOSE 91   BUN 18   CREATININE 0.85   CALCIUM 9.6                   Imaging  DX-ABDOMEN FOR TUBE PLACEMENT   Final Result      Feeding tube tip overlies the gastric body.      MR-BRAIN-W/O   Final Result         1. Age-related cerebral atrophy. Marked dilatation of the lateral ventricles and third ventricle and the possibility of normal pressure hydrocephalus should be considered.      2. Mild periventricular white matter changes consistent with chronic microvascular ischemic gliosis.      3. Moderate-sized arachnoid cyst arising in the anterior medial aspect left middle cranial fossa and extending into the suprasellar cistern.         4. Small crescentic area of chronic lacunar type infarction involving the left caudate nucleus      CT-HEAD W/O   Final Result      1.  Cerebral atrophy.      2.  White matter lucencies most consistent with small vessel ischemic change versus demyelination or gliosis.      3. No evidence of acute cerebral infarction, hemorrhage or mass lesion.      4. Prominent ventricles likely related to central atrophy versus normal pressure hydrocephalus.      5. CSF attenuation structure in the left middle cranial fossa likely representing arachnoid cyst versus prominence of the subdural space related to underlying atrophy.      DX-CHEST-PORTABLE (1 VIEW)   Final Result      Hypoinflation without other evidence for acute cardiopulmonary disease.           Assessment/Plan  Hydrocephalus (HCC)- (present on admission)  Assessment & Plan  Comfort  care    Dysphagia- (present on admission)  Assessment & Plan  Dysphagia 1, NTL  Comfort care       Encephalopathy- (present on admission)  Assessment & Plan  Acute vs chronic?  Comfort care    Leukocytosis- (present on admission)  Assessment & Plan  Comfort care    Vitamin B12 deficiency- (present on admission)  Assessment & Plan  Comfort care    PAF (paroxysmal atrial fibrillation) (HCC)- (present on admission)  Assessment & Plan  Comfort care       History of CVA (cerebrovascular accident)- (present on admission)  Assessment & Plan  Plavix    Essential hypertension, benign- (present on admission)  Assessment & Plan  Losartan    Hypothyroidism- (present on admission)  Assessment & Plan  Levothyroxine        VTE prophylaxis: Comfort care

## 2020-02-07 LAB
GAMMA INTERFERON BACKGROUND BLD IA-ACNC: 0.05 IU/ML
M TB IFN-G BLD-IMP: NEGATIVE
M TB IFN-G CD4+ BCKGRND COR BLD-ACNC: 0.11 IU/ML
MITOGEN IGNF BCKGRD COR BLD-ACNC: >10 IU/ML
QFT TB2 - NIL TBQ2: 0.1 IU/ML

## 2020-02-07 PROCEDURE — 770001 HCHG ROOM/CARE - MED/SURG/GYN PRIV*

## 2020-02-07 PROCEDURE — 99231 SBSQ HOSP IP/OBS SF/LOW 25: CPT | Performed by: FAMILY MEDICINE

## 2020-02-07 NOTE — PROGRESS NOTES
Assumed care of pt at 1900. Got report at bedside by day shift nurse. Assessment completed, pt A&Ox 3. Pt denies pain 0/10. Bed in lowest position, bed locked, bed alarm on, call light and possessions in reach.

## 2020-02-07 NOTE — DISCHARGE PLANNING
Anticipated Discharge Disposition:   [facility] with [facility]    Action:   RN CM spoke with patient's family while they were at group home.  Family wants [facility] for services.    RN CM received verbal authorization from patient's family to send choice for hospice to [facility]    Barriers to Discharge:   Acceptance to [facility]  Acceptance to [facility]    Plan:   RN CM faxed hospice choice form to [facility]

## 2020-02-07 NOTE — PROGRESS NOTES
[facility] Medicine Daily Progress Note    Date of Service  2/7/2020    Chief Complaint  79 y.o. male admitted 1/31/2020 with encephalopathy.    Hospital Course  Admitted with encephalopathy, known history of CVA, with expressive aphasia, and normal pressure hydrocephalus    Interval Problem Update  Encephalopathy - chronic?  Dysphagia - tolerating meds and NTL, appetite is good and consuming > 80% of his meals    Consultants/Specialty  Palliative care    Code Status  DNR/DNI/comfort care    Disposition  Hospice    Review of Systems  Review of Systems   Unable to perform ROS: Medical condition        Physical Exam  Temp:  [36.6 °C (97.8 °F)-36.8 °C (98.2 °F)] 36.8 °C (98.2 °F)  Pulse:  [79-81] 79  Resp:  [18] 18  BP: (113-117)/(80-90) 117/90  SpO2:  [93 %-94 %] 93 %    Physical Exam  HENT:      Head: Normocephalic and atraumatic.      Nose: No congestion.      Mouth/Throat:      Mouth: Mucous membranes are moist.   Eyes:      Conjunctiva/sclera: Conjunctivae normal.      Pupils: Pupils are equal, round, and reactive to light.   Neck:      Musculoskeletal: No muscular tenderness.   Cardiovascular:      Rate and Rhythm: Normal rate and regular rhythm.   Pulmonary:      Effort: Pulmonary effort is normal.      Breath sounds: Normal breath sounds.   Abdominal:      General: Bowel sounds are normal. There is no distension.      Palpations: Abdomen is soft.      Tenderness: There is no tenderness. There is no guarding or rebound.   Musculoskeletal:      Right lower leg: No edema.      Left lower leg: No edema.   Lymphadenopathy:      Cervical: No cervical adenopathy.   Skin:     General: Skin is warm and dry.   Neurological:      Mental Status: He is alert.      Comments: Expressive aphasia  Does not really follow commands  Good  bilaterally     Psychiatric:         Attention and Perception: He is inattentive.         Speech: He is noncommunicative.         Behavior: Behavior is slowed.         Cognition and Memory:  Cognition is impaired. Memory is impaired.         Fluids    Intake/Output Summary (Last 24 hours) at 2/7/2020 1510  Last data filed at 2/6/2020 1600  Gross per 24 hour   Intake --   Output 350 ml   Net -350 ml       Laboratory                        Imaging  DX-ABDOMEN FOR TUBE PLACEMENT   Final Result      Feeding tube tip overlies the gastric body.      MR-BRAIN-W/O   Final Result         1. Age-related cerebral atrophy. Marked dilatation of the lateral ventricles and third ventricle and the possibility of normal pressure hydrocephalus should be considered.      2. Mild periventricular white matter changes consistent with chronic microvascular ischemic gliosis.      3. Moderate-sized arachnoid cyst arising in the anterior medial aspect left middle cranial fossa and extending into the suprasellar cistern.         4. Small crescentic area of chronic lacunar type infarction involving the left caudate nucleus      CT-HEAD W/O   Final Result      1.  Cerebral atrophy.      2.  White matter lucencies most consistent with small vessel ischemic change versus demyelination or gliosis.      3. No evidence of acute cerebral infarction, hemorrhage or mass lesion.      4. Prominent ventricles likely related to central atrophy versus normal pressure hydrocephalus.      5. CSF attenuation structure in the left middle cranial fossa likely representing arachnoid cyst versus prominence of the subdural space related to underlying atrophy.      DX-CHEST-PORTABLE (1 VIEW)   Final Result      Hypoinflation without other evidence for acute cardiopulmonary disease.           Assessment/Plan  Hydrocephalus (HCC)- (present on admission)  Assessment & Plan  Comfort care    Dysphagia- (present on admission)  Assessment & Plan  Dysphagia 1, NTL  Comfort care       Encephalopathy- (present on admission)  Assessment & Plan  Acute vs chronic?  Comfort care    Leukocytosis- (present on admission)  Assessment & Plan  Comfort care    Vitamin B12  deficiency- (present on admission)  Assessment & Plan  Comfort care    PAF (paroxysmal atrial fibrillation) (HCC)- (present on admission)  Assessment & Plan  Comfort care       History of CVA (cerebrovascular accident)- (present on admission)  Assessment & Plan  Plavix    Essential hypertension, benign- (present on admission)  Assessment & Plan  Losartan    Hypothyroidism- (present on admission)  Assessment & Plan  Levothyroxine        VTE prophylaxis: Comfort care

## 2020-02-07 NOTE — DISCHARGE PLANNING
Received Choice form at [phone]  Agency/Facility Name: [facility]  Referral sent per Choice form @ [phone]

## 2020-02-07 NOTE — CARE PLAN
Problem: Safety  Goal: Will remain free from falls  Outcome: PROGRESSING AS EXPECTED  Note:   Pt has call light in place, bed alarm on, calls appropriately.      Problem: Skin Integrity  Goal: Risk for impaired skin integrity will decrease  Outcome: PROGRESSING AS EXPECTED  Note:   Waffle mattress on, Q2 turns in place.

## 2020-02-08 PROCEDURE — 770001 HCHG ROOM/CARE - MED/SURG/GYN PRIV*

## 2020-02-08 PROCEDURE — 99231 SBSQ HOSP IP/OBS SF/LOW 25: CPT | Performed by: FAMILY MEDICINE

## 2020-02-08 NOTE — CARE PLAN
Problem: Safety  Goal: Will remain free from falls  Outcome: PROGRESSING AS EXPECTED  Note:   Pt has call light in place, bed alarm on, calls appropriately.      Problem: Skin Integrity  Goal: Risk for impaired skin integrity will decrease  Outcome: PROGRESSING AS EXPECTED  Note:   Waffle mattress on, Q2 turns in place

## 2020-02-08 NOTE — DISCHARGE PLANNING
Care Transition Team Assessment    Information Source  Orientation : Disoriented to Event  Information Given By: Relative  Informant's Name: Aishwarya    Readmission Evaluation  Is this a readmission?: No    Elopement Risk  Legal Hold: No  Ambulatory or Self Mobile in Wheelchair: Yes  Disoriented: Situation-At Risk for Elopement  Elopement Risk: Not at Risk for Elopement  Wanderguard On: No (See Comments)  Personal Belongings: Hospital Clothing Only  Environmental Precautions: Sharp or Dangerous Items Removed    Interdisciplinary Discharge Planning  Does Admitting Nurse Feel This Could be a Complex Discharge?: No  Primary Care Physician: Dell Moore  Lives with - Patient's Self Care Capacity: Spouse  Patient or legal guardian wants to designate a caregiver (see row info): No  Support Systems: Children, Family Member(s), Friends / Neighbors, Spouse / Significant Other  Housing / Facility: Assisted Living Residence  Name of Care Facility: [facility]  Do You Take your Prescribed Medications Regularly: Yes  Able to Return to Previous ADL's: No  Mobility Issues: Yes  Prior Services: Intermittent Physical Support for ADL Per Family  Patient Expects to be Discharged to:: Group Home with [facility]  Assistance Needed: No    Discharge Preparedness  What is your plan after discharge?: Group home, Other (comment)([facility])  What are your discharge supports?: Child, Spouse  Prior Functional Level: Needs Assist with Activities of Daily Living, Needs Assist with Medication Management  Difficulity with ADLs: Walking  Difficulity with IADLs: Driving    Functional Assesment  Prior Functional Level: Needs Assist with Activities of Daily Living, Needs Assist with Medication Management    Finances  Financial Barriers to Discharge: No  Prescription Coverage: Yes    Vision / Hearing Impairment  Vision Impairment : No  Right Eye Vision: Wears Glasses  Left Eye Vision: Wears Glasses  Hearing Impairment : No    Values / Beliefs /  Concerns  Values / Beliefs Concerns : No    Advance Directive  Advance Directive?: POLST    Domestic Abuse  Have you ever been the victim of abuse or violence?: No  Physical Abuse or Sexual Abuse: No  Verbal Abuse or Emotional Abuse: No  Possible Abuse Reported to:: Not Applicable    Psychological Assessment  History of Substance Abuse: None  History of Psychiatric Problems: No  Non-compliant with Treatment: No  Newly Diagnosed Illness: Yes    Discharge Risks or Barriers  Discharge risks or barriers?: No  Patient risk factors: Vulnerable adult    Anticipated Discharge Information  Anticipated discharge disposition: Group home  Discharge Address: [address] [place] [address]  Discharge Contact Phone Number: [phone]

## 2020-02-08 NOTE — PROGRESS NOTES
[facility] Medicine Daily Progress Note    Date of Service  2/8/2020    Chief Complaint  79 y.o. male admitted 1/31/2020 with encephalopathy.    Hospital Course  Admitted with encephalopathy, known history of CVA, with expressive aphasia, and normal pressure hydrocephalus    Interval Problem Update  Sitting up on chair, appears comfortable.    Consultants/Specialty  Palliative care    Code Status  DNR/DNI/Comfort care    Disposition  Hospice    Review of Systems  Review of Systems   Unable to perform ROS: Medical condition        Physical Exam  Temp:  [36.3 °C (97.3 °F)-36.8 °C (98.2 °F)] 36.3 °C (97.3 °F)  Pulse:  [76-88] 88  Resp:  [16-17] 16  BP: (121-134)/() 127/87  SpO2:  [95 %-98 %] 98 %    Physical Exam  HENT:      Head: Normocephalic and atraumatic.      Nose: No congestion.      Mouth/Throat:      Mouth: Mucous membranes are moist.   Eyes:      Conjunctiva/sclera: Conjunctivae normal.      Pupils: Pupils are equal, round, and reactive to light.   Neck:      Musculoskeletal: No muscular tenderness.   Cardiovascular:      Rate and Rhythm: Normal rate and regular rhythm.   Pulmonary:      Effort: Pulmonary effort is normal.      Breath sounds: Normal breath sounds.   Abdominal:      General: Bowel sounds are normal. There is no distension.      Palpations: Abdomen is soft.      Tenderness: There is no tenderness. There is no guarding or rebound.   Musculoskeletal:      Right lower leg: No edema.      Left lower leg: No edema.   Lymphadenopathy:      Cervical: No cervical adenopathy.   Skin:     General: Skin is warm and dry.   Neurological:      Mental Status: He is alert.      Comments: Expressive aphasia  Does not really follow commands  Good  bilaterally     Psychiatric:         Attention and Perception: He is inattentive.         Speech: He is noncommunicative.         Behavior: Behavior is slowed.         Cognition and Memory: Cognition is impaired. Memory is impaired.         Fluids  No intake  or output data in the 24 hours ending 02/08/20 1116    Laboratory                        Imaging  DX-ABDOMEN FOR TUBE PLACEMENT   Final Result      Feeding tube tip overlies the gastric body.      MR-BRAIN-W/O   Final Result         1. Age-related cerebral atrophy. Marked dilatation of the lateral ventricles and third ventricle and the possibility of normal pressure hydrocephalus should be considered.      2. Mild periventricular white matter changes consistent with chronic microvascular ischemic gliosis.      3. Moderate-sized arachnoid cyst arising in the anterior medial aspect left middle cranial fossa and extending into the suprasellar cistern.         4. Small crescentic area of chronic lacunar type infarction involving the left caudate nucleus      CT-HEAD W/O   Final Result      1.  Cerebral atrophy.      2.  White matter lucencies most consistent with small vessel ischemic change versus demyelination or gliosis.      3. No evidence of acute cerebral infarction, hemorrhage or mass lesion.      4. Prominent ventricles likely related to central atrophy versus normal pressure hydrocephalus.      5. CSF attenuation structure in the left middle cranial fossa likely representing arachnoid cyst versus prominence of the subdural space related to underlying atrophy.      DX-CHEST-PORTABLE (1 VIEW)   Final Result      Hypoinflation without other evidence for acute cardiopulmonary disease.           Assessment/Plan  Hydrocephalus (HCC)- (present on admission)  Assessment & Plan  Comfort care    Dysphagia- (present on admission)  Assessment & Plan  Dysphagia 1, NTL  Comfort care       Encephalopathy- (present on admission)  Assessment & Plan  Acute vs chronic?  Comfort care    Leukocytosis- (present on admission)  Assessment & Plan  Comfort care    Vitamin B12 deficiency- (present on admission)  Assessment & Plan  Comfort care    PAF (paroxysmal atrial fibrillation) (HCC)- (present on admission)  Assessment &  Plan  Comfort care       History of CVA (cerebrovascular accident)- (present on admission)  Assessment & Plan  Plavix    Essential hypertension, benign- (present on admission)  Assessment & Plan  Losartan    Hypothyroidism- (present on admission)  Assessment & Plan  Levothyroxine        VTE prophylaxis: Comfort care

## 2020-02-08 NOTE — PROGRESS NOTES
Assumed care of pt at 1900. Got report at bedside by day shift nurse. Assessment completed, pt A&Ox 4. Pt denies pain 0/10. Bed in lowest position, bed locked, bed alarm on, call light and possessions in reach.

## 2020-02-09 PROCEDURE — 770001 HCHG ROOM/CARE - MED/SURG/GYN PRIV*

## 2020-02-09 PROCEDURE — 99231 SBSQ HOSP IP/OBS SF/LOW 25: CPT | Performed by: FAMILY MEDICINE

## 2020-02-09 RX ADMIN — MINERAL OIL, PETROLATUM 1 APPLICATION: 425; 573 OINTMENT OPHTHALMIC at 17:13

## 2020-02-09 NOTE — PROGRESS NOTES
Significant events overnight: None    Neuro status: Disoriented to event    Cardiac: No tele monitor    Pain: Denies    Bladder/Bowel: Condom cath. No BM.    Diet: Regular w/ NTL, sips w/ thins as tolerated    Mobility: Standby w/ FWW

## 2020-02-09 NOTE — PROGRESS NOTES
Received report from NOC RNPercy.  Bed in lowest position, wheels locked, call light within reach, bed alarm on.

## 2020-02-09 NOTE — CARE PLAN
Problem: Safety  Goal: Will remain free from injury  Outcome: PROGRESSING AS EXPECTED  Note:   Reviewed patient's mobility status, discussed with care team of patient needs, verifying appropriate safety precautions in place, providing patient education, ensuring call lights are within reach, non-slip socks in use, evaluating needs alarms & monitoring every shift, continuing with current plan of care.       Problem: Knowledge Deficit  Goal: Knowledge of disease process/condition, treatment plan, diagnostic tests, and medications will improve  Outcome: PROGRESSING AS EXPECTED  Note:   Educated patient about current POC, activities, encouraging patient to ask questions regarding care plan, providing answers to patient's questions, educating patient about medications, encouraging patient involvement in care process. Continuing with current POC.

## 2020-02-09 NOTE — PROGRESS NOTES
DONNA left for Octavia with palliative care team.  Per Octavia's request, she wanted to know when family is here.  Detailed VM left for her as patient's sons and wife at bedside

## 2020-02-09 NOTE — PROGRESS NOTES
"[facility] Medicine Daily Progress Note    Date of Service  2/9/2020    Chief Complaint  79 y.o. male admitted 1/31/2020 with encephalopathy.    Hospital Course  Admitted with encephalopathy, known history of CVA, with expressive aphasia, and normal pressure hydrocephalus    Interval Problem Update  Sitting up on chair, appears comfortable. States \"Good morning, I want more coffee.\"    Consultants/Specialty  Palliative care    Code Status  DNR/DNI/Comfort care    Disposition  Hospice    Review of Systems  Review of Systems   Unable to perform ROS: Medical condition        Physical Exam  Temp:  [36.5 °C (97.7 °F)-36.7 °C (98.1 °F)] 36.5 °C (97.7 °F)  Pulse:  [65-73] 65  Resp:  [14-16] 14  BP: (125-151)/(77-84) 125/77  SpO2:  [93 %-95 %] 93 %    Physical Exam  HENT:      Head: Normocephalic and atraumatic.      Nose: No congestion.      Mouth/Throat:      Mouth: Mucous membranes are moist.   Eyes:      Conjunctiva/sclera: Conjunctivae normal.      Pupils: Pupils are equal, round, and reactive to light.   Neck:      Musculoskeletal: No muscular tenderness.   Cardiovascular:      Rate and Rhythm: Normal rate and regular rhythm.   Pulmonary:      Effort: Pulmonary effort is normal.      Breath sounds: Normal breath sounds.   Abdominal:      General: Bowel sounds are normal. There is no distension.      Palpations: Abdomen is soft.      Tenderness: There is no tenderness. There is no guarding or rebound.   Musculoskeletal:      Right lower leg: No edema.      Left lower leg: No edema.   Lymphadenopathy:      Cervical: No cervical adenopathy.   Skin:     General: Skin is warm and dry.   Neurological:      Mental Status: He is alert.      Comments: Expressive aphasia  Does not really follow commands  Good  bilaterally     Psychiatric:         Attention and Perception: He is inattentive.         Speech: He is noncommunicative.         Behavior: Behavior is slowed.         Cognition and Memory: Cognition is impaired. " Memory is impaired.         Fluids  No intake or output data in the 24 hours ending 02/09/20 1037    Laboratory                        Imaging  DX-ABDOMEN FOR TUBE PLACEMENT   Final Result      Feeding tube tip overlies the gastric body.      MR-BRAIN-W/O   Final Result         1. Age-related cerebral atrophy. Marked dilatation of the lateral ventricles and third ventricle and the possibility of normal pressure hydrocephalus should be considered.      2. Mild periventricular white matter changes consistent with chronic microvascular ischemic gliosis.      3. Moderate-sized arachnoid cyst arising in the anterior medial aspect left middle cranial fossa and extending into the suprasellar cistern.         4. Small crescentic area of chronic lacunar type infarction involving the left caudate nucleus      CT-HEAD W/O   Final Result      1.  Cerebral atrophy.      2.  White matter lucencies most consistent with small vessel ischemic change versus demyelination or gliosis.      3. No evidence of acute cerebral infarction, hemorrhage or mass lesion.      4. Prominent ventricles likely related to central atrophy versus normal pressure hydrocephalus.      5. CSF attenuation structure in the left middle cranial fossa likely representing arachnoid cyst versus prominence of the subdural space related to underlying atrophy.      DX-CHEST-PORTABLE (1 VIEW)   Final Result      Hypoinflation without other evidence for acute cardiopulmonary disease.           Assessment/Plan  Hydrocephalus (HCC)- (present on admission)  Assessment & Plan  Comfort care    Dysphagia- (present on admission)  Assessment & Plan  Dysphagia 1, NTL  Comfort care       Encephalopathy- (present on admission)  Assessment & Plan  Acute vs chronic?  Comfort care    Leukocytosis- (present on admission)  Assessment & Plan  Comfort care    Vitamin B12 deficiency- (present on admission)  Assessment & Plan  Comfort care    PAF (paroxysmal atrial fibrillation) (HCC)-  (present on admission)  Assessment & Plan  Comfort care       History of CVA (cerebrovascular accident)- (present on admission)  Assessment & Plan  Plavix    Essential hypertension, benign- (present on admission)  Assessment & Plan  Losartan    Hypothyroidism- (present on admission)  Assessment & Plan  Levothyroxine        VTE prophylaxis: Comfort care

## 2020-02-09 NOTE — PALLIATIVE CARE
Palliative Care follow-up  Arrived at the bedside, no family present.   Called and spoke with pts daughter, Merline, who stated that she and Manuel, pts son, had worked on moving furniture yesterday and the plan was to for Segundo to DC to [facility] support hopefully tomorrow.   Merline stated that she would be in later today and would bring in a copy of the pts AD to be scanned.     Updated: JAYNE Figueroa    Plan: DC to [facility] once arrangements have been completed.     Thank you for allowing Palliative Care to support this patient and family. Contact [phone] for additional assistance, change in patient status, or with any questions/concerns.

## 2020-02-10 VITALS
RESPIRATION RATE: 19 BRPM | SYSTOLIC BLOOD PRESSURE: 146 MMHG | TEMPERATURE: 98.5 F | HEART RATE: 61 BPM | DIASTOLIC BLOOD PRESSURE: 89 MMHG | BODY MASS INDEX: 29.03 KG/M2 | HEIGHT: 67 IN | WEIGHT: 184.97 LBS | OXYGEN SATURATION: 90 %

## 2020-02-10 PROCEDURE — 99239 HOSP IP/OBS DSCHRG MGMT >30: CPT | Performed by: FAMILY MEDICINE

## 2020-02-10 PROCEDURE — A9270 NON-COVERED ITEM OR SERVICE: HCPCS | Performed by: FAMILY MEDICINE

## 2020-02-10 PROCEDURE — 700102 HCHG RX REV CODE 250 W/ 637 OVERRIDE(OP): Performed by: FAMILY MEDICINE

## 2020-02-10 RX ADMIN — LEVOTHYROXINE SODIUM 175 MCG: 150 TABLET ORAL at 08:18

## 2020-02-10 NOTE — PALLIATIVE CARE
Palliative Care follow-up  Received update from BS RN Carolina that pts family at the bedside.   Arrived and spoke with pts son, Manuel, pts wife June, and Junes brother and his wife. Manuel stated that he is still attempting to have [facility] deliver the bed tomorrow morning so that his father would be able to DC back to [facility] tomorrow.   Both pts children, Merline and Manuel, have arrangements to fly out Tuesday morning back to [place] and [place].   Manuel and Merline are both hoping to have their parents settled into the [facility] unit side before they have to leave.   Let them know that [facility] is usually very good at being able to get needed DME in place for DC.   Left message with [facility] with request for call back.   Let family know I will update the Unit SW/CM RN on the unit in the morning.     Updated: JAYNE RN    Plan: DC home to [facility] with [facility] support, hopefully tomorrow if all arrangements can be completed. Pts Quantiferon Gold negative.     Thank you for allowing Palliative Care to support this patient and family. Contact [phone] for additional assistance, change in patient status, or with any questions/concerns.

## 2020-02-10 NOTE — CARE PLAN
Problem: Safety  Goal: Will remain free from injury  Outcome: PROGRESSING AS EXPECTED  Note:   Reviewed patient's mobility status, discussed with care team of patient needs, verifying appropriate safety precautions in place, providing patient education, ensuring call lights are within reach, non-slip socks in use, evaluating needs alarms & monitoring every shift, continuing with current plan of care.       Problem: Knowledge Deficit  Goal: Knowledge of the prescribed therapeutic regimen will improve  Outcome: PROGRESSING AS EXPECTED  Note:   Educated patient about current POC, activities, encouraging patient to ask questions regarding care plan, providing answers to patient's questions, educating patient about medications, encouraging patient involvement in care process. Continuing with current POC.

## 2020-02-10 NOTE — DISCHARGE INSTRUCTIONS
Discharge Instructions    Discharged to [facility] other by medical transportation with escort. Discharged via wheelchair, hospital escort: Yes.  Special equipment needed: Not Applicable    Be sure to schedule a follow-up appointment with your primary care doctor or any specialists as instructed.     Discharge Plan:   Diet Plan: Discussed  Activity Level: Discussed  Confirmed Follow up Appointment: Patient to Call and Schedule Appointment  Confirmed Symptoms Management: Discussed  Medication Reconciliation Updated: Yes  Influenza Vaccine Indication: Patient Refuses    I understand that a diet low in cholesterol, fat, and sodium is recommended for good health. Unless I have been given specific instructions below for another diet, I accept this instruction as my diet prescription.   Other diet: Regular diet with nectar thick liquids    Special Instructions: None    · Is patient discharged on Warfarin / Coumadin?   No     Depression / Suicide Risk    As you are discharged from this [facility] facility, it is important to learn how to keep safe from harming yourself.    Recognize the warning signs:  · Abrupt changes in personality, positive or negative- including increase in energy   · Giving away possessions  · Change in eating patterns- significant weight changes-  positive or negative  · Change in sleeping patterns- unable to sleep or sleeping all the time   · Unwillingness or inability to communicate  · Depression  · Unusual sadness, discouragement and loneliness  · Talk of wanting to die  · Neglect of personal appearance   · Rebelliousness- reckless behavior  · Withdrawal from people/activities they love  · Confusion- inability to concentrate     If you or a loved one observes any of these behaviors or has concerns about self-harm, here's what you can do:  · Talk about it- your feelings and reasons for harming yourself  · Remove any means that you might use to hurt yourself (examples: pills, rope, extension  cords, firearm)  · Get professional help from the community (Mental Health, Substance Abuse, psychological counseling)  · Do not be alone:Call your Safe Contact- someone whom you trust who will be there for you.  · Call your local CRISIS HOTLINE 080-8363 or 727-292-5396  · Call your local Children's Mobile Crisis Response Team [place] (424) 311-5630 or www.[employer]bay  · Call the toll free National Suicide Prevention Hotlines   · National Suicide Prevention Lifeline 021-265-KWOE (1032)  · National Hope Line Network 800-SUICIDE (761-9167)

## 2020-02-10 NOTE — PALLIATIVE CARE
Palliative Care follow-up  Spoke with DIPTI Steiner and provided update that pts family is hoping for DC today to [facility].   Spoke with Shayla POLLARD Liaison at [facility] last night and bed was being delivered.   Shayla was to follow up to determine if there was any further information that was needed by [facility].   [facility] is on pts hard chart for DC.    Pts children who are making all the arrangements are:   Merline Jorge L [phone]  Manuel Falk [phone]    Plan: [facility] for DC today to [facility]     Thank you for allowing Palliative Care to support this patient and family. Contact [phone] for additional assistance, change in patient status, or with any questions/concerns.

## 2020-02-10 NOTE — PROGRESS NOTES
Anticipated Discharge Disposition:   [facility]    Action:    RN CM met with nurse from [facility].  She feels [facility] would be best means of transporting patient.  [facility] is currently booked; group home and Hospice RN feels this is best choice    Barriers to Discharge:   None    Plan:  RN CM to request [facility] for transport.      ADDENDUM:    LATRICE Corley [facility] for patient.  She said patient is able to sit up and did not need [facility].  LATRICE Corley did not want patient and the family to received a bill from [facility] that was not covered by patient's insurance.    RN CM informed LATRICE Corley that transport using [facility] would not be available for two days out at best for transport.      RN CM informed by family, [facility], and [facility] that patient has to transfer to group home today.  Family going back home to [place] and transfer has to be done today.    LATRICE CONDE contacted MYNOR Liao concerning the [facility] transportation and insurance coverage.  MYNOR Liao spoke to representative at [facility]; patient is hospice and [facility] is covered.  LATRICE CONDE updated LATRICE Corley.    [facility] at 13:30, today.

## 2020-02-10 NOTE — DISCHARGE PLANNING
Anticipated Discharge Disposition:   [facility]    Action:   LATRICE CONDE spoke with Shayla with [facility] confirming that DME was set up over the weekend; it has been.  RN CM then contacted [facility] and spoke with Moraima Lewis.  Patient has been accepted and they are hoping patient will be transferred today.    RN CM attempted to contact patient's daughter, Merline [phone], with call going to voice message with a full mailbox.  RN CM will attempt calls to both children of the patient.    LATRICE CONDE also contacted Lauren concerning the APS to update on the transfer.     Barriers to Discharge:   None    Plan:   RN CM contacted patient's daughter, Merline, to notify of transfer.    RN CM prepared discharge packet as well as provided number for report

## 2020-02-10 NOTE — DISCHARGE SUMMARY
Discharge Summary    CHIEF COMPLAINT ON ADMISSION  Chief Complaint   Patient presents with   • Weakness       Reason for Admission  EMS      CODE STATUS  Comfort Care/DNR    HPI & HOSPITAL COURSE  This is a 79 y.o. male here with encephalopathy.  He has known history of hydrocephalus, history of CVA with expressive aphasia.  Apparently suffers from cognitive and memory impairment.  Was noted to have dysphagia, family decided they want to try tube feeding, he did remove his cortrak.  At this point family decided not to replace core track and resume tube feeding.  He was placed on dysphagia 1 NTL diet.  He was able to tolerate this.  He was able to tolerate some of his medications.  Family decided to place the the patient on comfort care, and decide on hospice.       Therefore, he is discharged in fair and stable condition to hospice.    The patient met 2-midnight criteria for an inpatient stay at the time of discharge.      FOLLOW UP ITEMS POST DISCHARGE  Hospice    DISCHARGE DIAGNOSES  Active Problems:    Encephalopathy POA: Yes    Dysphagia POA: Yes    Hydrocephalus (HCC) POA: Yes    Essential hypertension, benign POA: Yes    History of CVA (cerebrovascular accident) POA: Yes    PAF (paroxysmal atrial fibrillation) (HCC) POA: Yes    Vitamin B12 deficiency POA: Yes    Leukocytosis POA: Yes    Hypothyroidism POA: Yes  Resolved Problems:    * No resolved hospital problems. *      FOLLOW UP  Future Appointments   Date Time Provider Department Center   3/2/2020  3:00 PM Eber Ibarra M.D. [facility] None   3/5/2020  2:20 PM Mikel Lira M.D. [facility] None     [address] [phone]          MEDICATIONS ON DISCHARGE     Medication List      CHANGE how you take these medications      Instructions   albuterol 108 (90 Base) MCG/ACT Aers inhalation aerosol  What changed:  Another medication with the same name was removed. Continue taking this medication, and  follow the directions you see here.   Inhale 2 Puffs by mouth every 6 hours as needed for Shortness of Breath.  Dose:  2 Puff        CONTINUE taking these medications      Instructions   atorvastatin 80 MG tablet  Commonly known as:  LIPITOR   Take 80 mg by mouth every evening.  Dose:  80 mg     clopidogrel 75 MG Tabs  Commonly known as:  PLAVIX   Take 75 mg by mouth every day.  Dose:  75 mg     fluticasone 110 MCG/ACT Aero  Commonly known as:  FLOVENT HFA   Inhale 2 Puffs by mouth 2 times a day.  Dose:  2 Puff     levothyroxine 175 MCG Tabs  Commonly known as:  SYNTHROID   Take 175 mcg by mouth Every morning on an empty stomach.  Dose:  175 mcg     losartan 25 MG Tabs  Commonly known as:  COZAAR   Take 1 Tab by mouth every day. STOP IF COUGH DEVELOPS  Dose:  25 mg     montelukast 10 MG Tabs  Commonly known as:  SINGULAIR   Take 10 mg by mouth every day.  Dose:  10 mg     omeprazole 20 MG Tbec delayed-release tablet  Commonly known as:  PRILOSEC   Take 20 mg by mouth every day.  Dose:  20 mg        STOP taking these medications    aspirin EC 81 MG Tbec  Commonly known as:  ECOTRIN     vitamin D (Ergocalciferol) 1.25 MG (16617 UT) Caps capsule  Commonly known as:  DRISDOL            Allergies  Allergies   Allergen Reactions   • Codeine Vomiting       DIET  Orders Placed This Encounter   Procedures   • Diet Order Regular     Standing Status:   Standing     Number of Occurrences:   1     Order Specific Question:   Diet:     Answer:   Regular [1]     Order Specific Question:   Consistency/Fluid modifications:     Answer:   Nectar Thick [2]       ACTIVITY  As tolerated.  Weight bearing as tolerated    LINES, DRAINS, AND WOUNDS  This is an automated list. Peripheral IVs will be removed prior to discharge.  Peripheral IV 01/31/20 20 G Left Antecubital (Active)   Site Assessment Clean;Dry;Intact 2/9/2020  8:30 AM   Dressing Type Transparent 2/9/2020  8:30 AM   Line Status Scrubbed the hub prior to access;Saline  locked;Flushed 2/9/2020  8:30 AM   Dressing Status Clean;Dry;Intact 2/9/2020  8:30 AM   Dressing Intervention N/A 2/9/2020  8:30 AM   Infiltration Grading ([facility], [facility]) 0 2/9/2020  8:30 AM   Phlebitis Scale ([facility]) 0 2/9/2020  8:30 AM          Peripheral IV 01/31/20 20 G Left Antecubital (Active)   Site Assessment Clean;Dry;Intact 2/9/2020  8:30 AM   Dressing Type Transparent 2/9/2020  8:30 AM   Line Status Scrubbed the hub prior to access;Saline locked;Flushed 2/9/2020  8:30 AM   Dressing Status Clean;Dry;Intact 2/9/2020  8:30 AM   Dressing Intervention N/A 2/9/2020  8:30 AM   Infiltration Grading ([facility], [facility]) 0 2/9/2020  8:30 AM   Phlebitis Scale ([facility] Only) 0 2/9/2020  8:30 AM               MENTAL STATUS ON TRANSFER  Level of Consciousness: Confused  Orientation : Disoriented to Event  Speech: Expressive Aphasia    CONSULTATIONS  Palliative care    PROCEDURES  EEG - No focal or generalized epileptiform activity noted. No regional slowing was seen during this routine study.  No clinical events or seizures were reported or recorded during the study.  2/4/2020    LABORATORY  Lab Results   Component Value Date    SODIUM 138 02/04/2020    POTASSIUM 3.8 02/04/2020    CHLORIDE 103 02/04/2020    CO2 25 02/04/2020    GLUCOSE 91 02/04/2020    BUN 18 02/04/2020    CREATININE 0.85 02/04/2020        Lab Results   Component Value Date    WBC 15.8 (H) 02/04/2020    HEMOGLOBIN 16.6 02/04/2020    HEMATOCRIT 50.7 02/04/2020    PLATELETCT 290 02/04/2020        Total time of the discharge process exceeds 40 minutes.

## 2020-02-10 NOTE — PROGRESS NOTES
Significant events overnight: None    Neuro status: Disoriented to situation    Cardiac: No tele monitor    Pain: Declines    Bladder/Bowel: Voids in bathroom. No BM overnight.    Diet: Regular w/ thick liquids    Mobility: Standby w/ FWW

## 2020-02-11 NOTE — PROGRESS NOTES
Discharge instructions reviewed with patient and called report to Tamara nurse at [facility]. Sanjana nurse with [facility] at bedside and will meet patient at facility to begin Hospice. ROBERT transporting patient to facility.

## 2020-03-02 ENCOUNTER — APPOINTMENT (OUTPATIENT)
Dept: NEUROLOGY | Facility: MEDICAL CENTER | Age: 80
End: 2020-03-02
Payer: MEDICARE

## 2021-01-14 DIAGNOSIS — Z23 NEED FOR VACCINATION: ICD-10-CM
